# Patient Record
Sex: FEMALE | Race: WHITE | Employment: UNEMPLOYED | URBAN - METROPOLITAN AREA
[De-identification: names, ages, dates, MRNs, and addresses within clinical notes are randomized per-mention and may not be internally consistent; named-entity substitution may affect disease eponyms.]

---

## 2017-04-06 ENCOUNTER — OFFICE VISIT (OUTPATIENT)
Dept: CARDIOLOGY CLINIC | Age: 52
End: 2017-04-06

## 2017-04-06 VITALS
DIASTOLIC BLOOD PRESSURE: 75 MMHG | HEART RATE: 66 BPM | SYSTOLIC BLOOD PRESSURE: 140 MMHG | BODY MASS INDEX: 36.43 KG/M2 | WEIGHT: 246 LBS | HEIGHT: 69 IN

## 2017-04-06 DIAGNOSIS — R00.2 PALPITATIONS: Primary | ICD-10-CM

## 2017-04-06 DIAGNOSIS — E66.9 OBESITY, UNSPECIFIED: ICD-10-CM

## 2017-04-06 DIAGNOSIS — I10 ESSENTIAL HYPERTENSION, BENIGN: ICD-10-CM

## 2017-04-06 DIAGNOSIS — I34.0 NON-RHEUMATIC MITRAL REGURGITATION: ICD-10-CM

## 2017-04-06 RX ORDER — SERTRALINE HYDROCHLORIDE 50 MG/1
TABLET, FILM COATED ORAL DAILY
COMMUNITY
End: 2018-08-10

## 2017-04-06 RX ORDER — AMLODIPINE BESYLATE 5 MG/1
5 TABLET ORAL DAILY
COMMUNITY
End: 2018-02-05

## 2017-04-06 RX ORDER — ZOLPIDEM TARTRATE 10 MG/1
TABLET ORAL
COMMUNITY

## 2017-04-06 NOTE — PROGRESS NOTES
HISTORY OF PRESENT ILLNESS  Renee Pierre is a 45 y.o. female. HPI Comments: 4/17 Seen after >3 yrs. Has HTN more than usual for last 1 wk    Palpitations    The history is provided by the patient. This is a new problem. The current episode started more than 2 days ago (4/2/17). The problem occurs daily (many). The problem is associated with nothing. Associated symptoms include lower extremity edema. Pertinent negatives include no diaphoresis, no fever, no malaise/fatigue, no chest pain, no claudication, no near-syncope, no orthopnea, no PND, no nausea, no vomiting, no headaches, no dizziness, no cough and no shortness of breath. Leg Swelling   The history is provided by the patient. This is a new problem. The current episode started more than 1 week ago. The problem occurs every several days. Pertinent negatives include no chest pain, no headaches and no shortness of breath. The symptoms are aggravated by standing. The symptoms are relieved by sleep. Review of Systems   Constitutional: Negative for chills, diaphoresis, fever, malaise/fatigue and weight loss. HENT: Negative for nosebleeds. Eyes: Negative for discharge. Respiratory: Negative for cough, shortness of breath and wheezing. Cardiovascular: Positive for palpitations and leg swelling. Negative for chest pain, orthopnea, claudication, PND and near-syncope. Gastrointestinal: Negative for diarrhea, nausea and vomiting. Genitourinary: Negative for dysuria and hematuria. Musculoskeletal: Negative for joint pain. Skin: Negative for rash. Neurological: Negative for dizziness, seizures, loss of consciousness and headaches. Endo/Heme/Allergies: Negative for polydipsia. Does not bruise/bleed easily. Psychiatric/Behavioral: Negative for depression and substance abuse. The patient does not have insomnia.       Allergies   Allergen Reactions    Codeine Itching       Past Medical History:   Diagnosis Date    Aortic valve disorders  Degenerative joint disease     Essential hypertension, benign     CONTROLLED     Lumbar herniated disc     Mitral valve disorders      MILD MR     Obesity, unspecified     Transfusion history     PATIENT HAS A HX OF RECEIVING BLOOD OR BLOOD PRODUCT TRANSFUSION(S)       Family History   Problem Relation Age of Onset    Heart Disease Mother 48      WITH MI    Heart Attack Mother     Heart Surgery Mother     Heart Attack Father        Social History   Substance Use Topics    Smoking status: Former Smoker     Quit date: 2015    Smokeless tobacco: None      Comment: SMOKED FOR LESS THAN 5 YEARS MANY YEARS AGO    Alcohol use 0.6 oz/week     1 Glasses of wine per week        Current Outpatient Prescriptions   Medication Sig    amLODIPine (NORVASC) 5 mg tablet Take 5 mg by mouth daily.  sertraline (ZOLOFT) 50 mg tablet Take  by mouth daily.  zolpidem (AMBIEN) 10 mg tablet Take  by mouth nightly as needed for Sleep. No current facility-administered medications for this visit. Past Surgical History:   Procedure Laterality Date    HX HERNIA REPAIR      HX LAP CHOLECYSTECTOMY      HX TUBAL LIGATION         Visit Vitals    /75    Pulse 66    Ht 5' 9\" (1.753 m)    Wt 111.6 kg (246 lb)    BMI 36.33 kg/m2       Diagnostic Studies:  I have reviewed the relevant tests done on the patient and show as follows  EKG tracings reviewed by me today. CARDIOLOGY STUDIES 2009 2004 3/1/2001   Myocardial Perfusion Scan Result - - ? NL SCAN, MILD FIXED ANTERIOR DEFECT, MILD CMP, EF 34%   Echocardiogram - Complete Result EF 60%, TRACE MR, MILD AI EF 60%, MILD MVP, MILD MR, MILD AI -   24 hr Holter Monitor Result - NO ARRHYTHMIAS -       Ms. Michael Colon has a reminder for a \"due or due soon\" health maintenance. I have asked that she contact her primary care provider for follow-up on this health maintenance.     Physical Exam   Constitutional: She is oriented to person, place, and time. She appears well-developed and well-nourished. No distress. sev obese   HENT:   Head: Normocephalic and atraumatic. Mouth/Throat: Normal dentition. Eyes: Right eye exhibits no discharge. Left eye exhibits no discharge. No scleral icterus. Neck: Neck supple. No JVD present. Carotid bruit is not present. No thyromegaly present. Cardiovascular: Normal rate, regular rhythm, S1 normal, S2 normal, normal heart sounds and intact distal pulses. Exam reveals no gallop and no friction rub. No murmur heard. Pulmonary/Chest: Effort normal and breath sounds normal. She has no wheezes. She has no rales. Abdominal: Soft. She exhibits no mass. There is no tenderness. Musculoskeletal: She exhibits no edema. Lymphadenopathy:        Right cervical: No superficial cervical adenopathy present. Left cervical: No superficial cervical adenopathy present. Neurological: She is alert and oriented to person, place, and time. Skin: Skin is warm and dry. No rash noted. Psychiatric: She has a normal mood and affect. Her behavior is normal.       ASSESSMENT and PLAN      Chriss Wilson was seen today for new patient, hypertension and palpitations. Diagnoses and all orders for this visit:    Palpitations  Comments:  hemodynamically tolerated  get labs from PCP  Orders:  -     AMB POC EKG ROUTINE W/ 12 LEADS, INTER & REP  -     METABOLIC PANEL, BASIC; Future  -     MAGNESIUM; Future  -     HEPATIC FUNCTION PANEL; Future  -     ECG MONITOR/24 HRS,COMPLETE; Future    Essential hypertension, benign  Comments:  CONTROLLED  get labs from PCP  Orders:  -     METABOLIC PANEL, BASIC; Future  -     MAGNESIUM; Future  -     HEPATIC FUNCTION PANEL; Future    Non-rheumatic mitral regurgitation  Comments:  8/09 MILD MR  Orders:  -     2D ECHO COMPLETE ADULT (TTE); Future    Obesity, unspecified  Comments:  Weight loss has been strongly encouraged by following dietary restrictions and an exercise routine.           Pertinent laboratory and test data reviewed and discussed with patient.   See patient instructions also for other medical advice given    Medications Discontinued During This Encounter   Medication Reason    ferrous sulfate 325 mg (65 mg iron) tablet Not A Current Medication    FLUoxetine (PROZAC) 20 mg capsule Discontinued by Another Clinician       Follow-up Disposition:  Return in about 1 month (around 5/6/2017), or if symptoms worsen or fail to improve, for post test.

## 2017-04-06 NOTE — LETTER
García Jastifoniel  1965 4/6/2017      Dear MD Hiral Steele, STEVEN    I had the pleasure of evaluating  Ms. Andrea Reynoso in office today. Below are the relevant portions of my assessment and plan of care. ICD-10-CM ICD-9-CM    1. Palpitations R00.2 785.1 AMB POC EKG ROUTINE W/ 12 LEADS, INTER & REP      METABOLIC PANEL, BASIC      MAGNESIUM      HEPATIC FUNCTION PANEL      ECG MONITOR/24 HRS,COMPLETE    hemodynamically tolerated  get labs from PCP   2. Essential hypertension, benign O82 674.6 METABOLIC PANEL, BASIC      MAGNESIUM      HEPATIC FUNCTION PANEL    CONTROLLED  get labs from PCP   3. Non-rheumatic mitral regurgitation I34.0 424.0 2D ECHO COMPLETE ADULT (TTE)    8/09 MILD MR   4. Obesity, unspecified E66.9 278.00     Weight loss has been strongly encouraged by following dietary restrictions and an exercise routine. Current Outpatient Prescriptions   Medication Sig Dispense Refill    amLODIPine (NORVASC) 5 mg tablet Take 5 mg by mouth daily.  sertraline (ZOLOFT) 50 mg tablet Take  by mouth daily.  zolpidem (AMBIEN) 10 mg tablet Take  by mouth nightly as needed for Sleep.          Orders Placed This Encounter    METABOLIC PANEL, BASIC     Standing Status:   Future     Standing Expiration Date:   7/7/2017    MAGNESIUM     Standing Status:   Future     Standing Expiration Date:   7/7/2017    HEPATIC FUNCTION PANEL     Standing Status:   Future     Standing Expiration Date:   7/7/2017    2D ECHO COMPLETE ADULT (TTE)     Standing Status:   Future     Standing Expiration Date:   10/3/2017     Order Specific Question:   Reason for Exam:     Answer:   mr    ECG MONITOR/24 HRS,COMPLETE     Standing Status:   Future     Standing Expiration Date:   10/3/2017     Order Specific Question:   Reason for Exam:     Answer:   palpitations    AMB POC EKG ROUTINE W/ 12 LEADS, INTER & REP     Order Specific Question:   Reason for Exam:     Answer:   palpitations    amLODIPine (NORVASC) 5 mg tablet     Sig: Take 5 mg by mouth daily.  sertraline (ZOLOFT) 50 mg tablet     Sig: Take  by mouth daily.  zolpidem (AMBIEN) 10 mg tablet     Sig: Take  by mouth nightly as needed for Sleep. If you have questions, please do not hesitate to call me. I look forward to following Ms. Persaud along with you.     Sincerely,  Beatriz Kennedy MD

## 2017-04-06 NOTE — PATIENT INSTRUCTIONS
Medications Discontinued During This Encounter   Medication Reason    ferrous sulfate 325 mg (65 mg iron) tablet Not A Current Medication    FLUoxetine (PROZAC) 20 mg capsule Discontinued by Another Clinician       Orders Placed This Encounter    METABOLIC PANEL, BASIC     Standing Status:   Future     Standing Expiration Date:   7/7/2017    MAGNESIUM     Standing Status:   Future     Standing Expiration Date:   7/7/2017    HEPATIC FUNCTION PANEL     Standing Status:   Future     Standing Expiration Date:   7/7/2017    2D ECHO COMPLETE ADULT (TTE)     Standing Status:   Future     Standing Expiration Date:   10/3/2017     Order Specific Question:   Reason for Exam:     Answer:   mr    ECG MONITOR/24 HRS,COMPLETE     Standing Status:   Future     Standing Expiration Date:   10/3/2017     Order Specific Question:   Reason for Exam:     Answer:   palpitations    AMB POC EKG ROUTINE W/ 12 LEADS, INTER & REP     Order Specific Question:   Reason for Exam:     Answer:   palpitations          Mediterranean Diet: Care Instructions  Your Care Instructions  The Mediterranean diet features foods eaten in [place], [place], [place] and [place], and other countries that border the [facility]. It emphasizes eating a diet rich in fruits, vegetables, nuts, and high-fiber grains, and limits meat, cheese, and sweets. The Mediterranean diet may:  · Prevent heart disease and lower the risk of a heart attack or stroke. · Prevent type 2 diabetes. · Prevent Alzheimer's disease and other dementia. · Prevent depression. · Prevent Parkinson's disease. This diet contains more fat than other heart-healthy diets. But the fats are mainly from nuts, unsaturated oils, such as fish oils, olive oil, and certain nut or seed oils (such as canola, soybean, or flaxseed oil). These types of oils may help protect the heart and blood vessels. Follow-up care is a key part of your treatment and safety.  Be sure to make and go to all appointments, and call your doctor if you are having problems. It's also a good idea to know your test results and keep a list of the medicines you take. How can you care for yourself at home? What to eat  · Eat a variety of fruits and vegetables each day, such as grapes, blueberries, tomatoes, broccoli, peppers, figs, olives, spinach, eggplant, beans, lentils, and chickpeas. · Eat a variety of whole-grain foods each day, such as oats, brown rice, and whole wheat bread, pasta, and couscous. · Eat fish at least 2 times a week. Try tuna, salmon, mackerel, lake trout, herring, or sardines. · Eat moderate amounts of low-fat dairy products, such as milk, cheese, or yogurt. · Eat moderate amounts of poultry and eggs. · Choose healthy (unsaturated) fats, such as nuts, olive oil and certain nut or seed oils like canola, soybean, and flaxseed. · Limit unhealthy (saturated) fats, such as butter, palm oil, and coconut oil. And limit fats found in animal products, such as meat and dairy products made with whole milk. Try to eat red meat only a few times a month in very small amounts. · Limit sweets and desserts to only a few times a week. This includes sugar-sweetened drinks like soda. The Mediterranean diet may also include red wine with your meal1 glass each day for women and up to 2 glasses a day for men. Tips for changing your diet  · Dip bread in a mix of olive oil and fresh herbs instead of using butter. · Add avocado slices to your sandwich instead of curran. · Have fish for lunch or dinner instead of red meat. Brush the fish with olive oil, and broil or grill it. · Sprinkle your salad with seeds or nuts instead of cheese. · Cook with olive or canola oil instead of butter or oils that are high in saturated fat. · Switch from 2% milk or whole milk to 1% or fat-free milk.   · Dip raw vegetables in a vinaigrette dressing or hummus instead of dips made from mayonnaise or sour cream.  · Have a piece of fruit for dessert instead of a piece of cake. Try baked apples, or have some dried fruit. Part of the Mediterranean diet is being active. Get at least 30 minutes of exercise on most days of the week. Walking is a good choice. You also may want to do other activities, such as running, swimming, cycling, or playing tennis or team sports. Where can you learn more? Go to http://hu-evie.info/. Enter O407 in the search box to learn more about \"Mediterranean Diet: Care Instructions. \"  Current as of: October 21, 2016  Content Version: 11.2  © 9372-5101 [employer]. Care instructions adapted under license by [employer] (which disclaims liability or warranty for this information). If you have questions about a medical condition or this instruction, always ask your healthcare professional. [address] any warranty or liability for your use of this information.

## 2017-04-06 NOTE — MR AVS SNAPSHOT
Visit Information     Date & Time Provider Department Dept. Phone Encounter #    4/6/2017 12:45 PM Paz Schwarz MD [facility] [phone] [id number]      Follow-up Instructions     Return in about 1 month (around 5/6/2017), or if symptoms worsen or fail to improve, for post test.      Your Appointments     4/17/2017  1:30 PM   PROCEDURE with CA ECHO   [facility] ([address])   Appt Note: Echo/Do    [address]           [address] [place] [address]            5/18/2017  9:30 AM   ESTABLISHED PATIENT with Paz Schwarz MD   [facility] ([id number] [place])   Appt Note: Post echo/holter/BMP/Mag/LFT    [address]           [address] [facility] [address]              Upcoming Health Maintenance        Date Due    Hepatitis C Screening 1965    DTaP/Tdap/Td series (1 - Tdap) 12/17/1985    PAP AKA CERVICAL CYTOLOGY 12/17/1985    BREAST CANCER SCRN MAMMOGRAM 12/17/2014    FOBT Q 1 YEAR AGE 50-75 12/17/2014    INFLUENZA AGE 9 TO ADULT 8/1/2016      Allergies as of 4/6/2017  Review Complete On: 4/6/2017 By: Paz Schwarz MD       Severity Noted Reaction Type Reactions    Codeine High   Itching      Current Immunizations  Never Reviewed    No immunizations on file. Not reviewed this visit   You Were Diagnosed With        Codes Comments    Palpitations    -  Primary ICD-10-CM: R00.2  ICD-9-CM: 785.1 hemodynamically tolerated  get labs from PCP    Essential hypertension, benign     ICD-10-CM: I10  ICD-9-CM: 401.1 CONTROLLED  get labs from PCP    Non-rheumatic mitral regurgitation     ICD-10-CM: I34.0  ICD-9-CM: 424.0 8/09 MILD MR    Obesity, unspecified     ICD-10-CM: E66.9  ICD-9-CM: 278.00 Weight loss has been strongly encouraged by following dietary restrictions and an exercise routine.         Vitals     BP Pulse Height(growth percentile) Weight(growth percentile) BMI Smoking Status    140/75 66 5' 9\" (1.753 m) 246 lb (111.6 kg) 36.33 kg/m2 Former Smoker    Vitals History      BMI and BSA Data     Body Mass Index Body Surface Area    36.33 kg/m 2 2.33 m 2         Your Updated Medication List          This list is accurate as of: 4/6/17  2:03 PM.  Always use your most recent med list.                amLODIPine 5 mg tablet   Commonly known as:  NORVASC   Take 5 mg by mouth daily. sertraline 50 mg tablet   Commonly known as:  ZOLOFT   Take  by mouth daily. zolpidem 10 mg tablet   Commonly known as:  AMBIEN   Take  by mouth nightly as needed for Sleep.                We Performed the Following     AMB POC EKG ROUTINE W/ 12 LEADS, INTER & REP [82808 CPT(R)]       Follow-up Instructions     Return in about 1 month (around 5/6/2017), or if symptoms worsen or fail to improve, for post test.      To-Do List     Around 04/06/2017     Lab:  HEPATIC FUNCTION PANEL        Around 04/06/2017     Lab:  MAGNESIUM        Around 04/06/2017     Lab:  METABOLIC PANEL, BASIC        Around 04/09/2017     Cardiac Services:  2D ECHO COMPLETE ADULT (TTE)        Around 04/09/2017     Cardiac Services:  ECG MONITOR/24 HRS,COMPLETE          Patient Instructions    Medications Discontinued During This Encounter   Medication Reason    ferrous sulfate 325 mg (65 mg iron) tablet Not A Current Medication    FLUoxetine (PROZAC) 20 mg capsule Discontinued by Another Clinician       Orders Placed This Encounter    METABOLIC PANEL, BASIC     Standing Status:   Future     Standing Expiration Date:   7/7/2017    MAGNESIUM     Standing Status:   Future     Standing Expiration Date:   7/7/2017    HEPATIC FUNCTION PANEL     Standing Status:   Future     Standing Expiration Date:   7/7/2017    2D ECHO COMPLETE ADULT (TTE)     Standing Status:   Future     Standing Expiration Date:   10/3/2017     Order Specific Question:   Reason for Exam:     Answer:   mr    ECG MONITOR/24 HRS,COMPLETE     Standing Status:   Future     Standing Expiration Date:   10/3/2017     Order Specific Question:   Reason for Exam:     Answer:   palpitations    AMB POC EKG ROUTINE W/ 12 LEADS, INTER & REP     Order Specific Question:   Reason for Exam:     Answer:   palpitations          Mediterranean Diet: Care Instructions  Your Care Instructions  The Mediterranean diet features foods eaten in [place], [place], [place] and [place], and other countries that border the [facility]. It emphasizes eating a diet rich in fruits, vegetables, nuts, and high-fiber grains, and limits meat, cheese, and sweets. The Mediterranean diet may:  · Prevent heart disease and lower the risk of a heart attack or stroke. · Prevent type 2 diabetes. · Prevent Alzheimer's disease and other dementia. · Prevent depression. · Prevent Parkinson's disease. This diet contains more fat than other heart-healthy diets. But the fats are mainly from nuts, unsaturated oils, such as fish oils, olive oil, and certain nut or seed oils (such as canola, soybean, or flaxseed oil). These types of oils may help protect the heart and blood vessels. Follow-up care is a key part of your treatment and safety. Be sure to make and go to all appointments, and call your doctor if you are having problems. It's also a good idea to know your test results and keep a list of the medicines you take. How can you care for yourself at home? What to eat  · Eat a variety of fruits and vegetables each day, such as grapes, blueberries, tomatoes, broccoli, peppers, figs, olives, spinach, eggplant, beans, lentils, and chickpeas. · Eat a variety of whole-grain foods each day, such as oats, brown rice, and whole wheat bread, pasta, and couscous. · Eat fish at least 2 times a week. Try tuna, salmon, mackerel, lake trout, herring, or sardines. · Eat moderate amounts of low-fat dairy products, such as milk, cheese, or yogurt.   · Eat moderate amounts of poultry and eggs. · Choose healthy (unsaturated) fats, such as nuts, olive oil and certain nut or seed oils like canola, soybean, and flaxseed. · Limit unhealthy (saturated) fats, such as butter, palm oil, and coconut oil. And limit fats found in animal products, such as meat and dairy products made with whole milk. Try to eat red meat only a few times a month in very small amounts. · Limit sweets and desserts to only a few times a week. This includes sugar-sweetened drinks like soda. The Mediterranean diet may also include red wine with your meal1 glass each day for women and up to 2 glasses a day for men. Tips for changing your diet  · Dip bread in a mix of olive oil and fresh herbs instead of using butter. · Add avocado slices to your sandwich instead of curran. · Have fish for lunch or dinner instead of red meat. Brush the fish with olive oil, and broil or grill it. · Sprinkle your salad with seeds or nuts instead of cheese. · Cook with olive or canola oil instead of butter or oils that are high in saturated fat. · Switch from 2% milk or whole milk to 1% or fat-free milk. · Dip raw vegetables in a vinaigrette dressing or hummus instead of dips made from mayonnaise or sour cream.  · Have a piece of fruit for dessert instead of a piece of cake. Try baked apples, or have some dried fruit. Part of the Mediterranean diet is being active. Get at least 30 minutes of exercise on most days of the week. Walking is a good choice. You also may want to do other activities, such as running, swimming, cycling, or playing tennis or team sports. Where can you learn more? Go to http://hu-evie.info/. Enter O407 in the search box to learn more about \"Mediterranean Diet: Care Instructions. \"  Current as of: October 21, 2016  Content Version: 11.2  © 3229-2142 [employer].  Care instructions adapted under license by [employer] (which disclaims liability or warranty for this information). If you have questions about a medical condition or this instruction, always ask your healthcare professional. [address] any warranty or liability for your use of this information. Introducing [facility]! Madina Herrera[employer]om introduces The [employer] patient portal. Now you can access parts of your medical record, email your doctor's office, and request medication refills online. 1. In your internet browser, go to [email or web address]   2. Click on the First Time User? Click Here link in the Sign In box. You will see the New Member Sign Up page. 3. Enter your [employer] Access Code exactly as it appears below. You will not need to use this code after youve completed the sign-up process. If you do not sign up before the expiration date, you must request a new code. · [employer] Access Code: [email or web address]  Expires: 7/5/2017 12:35 PM    4. Enter the last four digits of your Social Security Number (xxxx) and Date of Birth (mm/dd/yyyy) as indicated and click Submit. You will be taken to the next sign-up page. 5. Create a The [employer] ID. This will be your [employer] login ID and cannot be changed, so think of one that is secure and easy to remember. 6. Create a [employer] password. You can change your password at any time. 7. Enter your Password Reset Question and Answer. This can be used at a later time if you forget your password. 8. Enter your e-mail address. You will receive e-mail notification when new information is available in [address] 9. Click Sign Up. You can now view and download portions of your medical record. 10. Click the Download Summary menu link to download a portable copy of your medical information. If you have questions, please visit the Frequently Asked Questions section of the [employer] website. Remember, [employer] is NOT to be used for urgent needs. For medical emergencies, dial 911. Now available from your iPhone and Android! Please provide this summary of care documentation to your next provider. Your primary care clinician is listed as Narendra Espinal. If you have any questions after today's visit, please call [phone].

## 2017-04-11 LAB
A-G RATIO,AGRAT: 1.2 RATIO (ref 1.1–2.6)
ALBUMIN SERPL-MCNC: 4 G/DL (ref 3.5–5)
ALP SERPL-CCNC: 81 U/L (ref 25–115)
ALT SERPL-CCNC: 15 U/L (ref 5–40)
ANION GAP SERPL CALC-SCNC: 13.3 MMOL/L
AST SERPL W P-5'-P-CCNC: 15 U/L (ref 10–37)
BILIRUB SERPL-MCNC: 0.4 MG/DL (ref 0.2–1.2)
BILIRUBIN, DIRECT,CBIL: <0.2 MG/DL (ref 0–0.3)
BUN SERPL-MCNC: 13 MG/DL (ref 6–22)
CALCIUM SERPL-MCNC: 9.4 MG/DL (ref 8.4–10.5)
CHLORIDE SERPL-SCNC: 102 MMOL/L (ref 98–110)
CO2 SERPL-SCNC: 26 MMOL/L (ref 20–32)
CREAT SERPL-MCNC: 0.5 MG/DL (ref 0.5–1.2)
GFRAA, 66117: >60
GFRNA, 66118: >60
GLOBULIN,GLOB: 3.3 G/DL (ref 2–4)
GLUCOSE SERPL-MCNC: 100 MG/DL (ref 65–99)
MAGNESIUM SERPL-MCNC: 2.1 MG/DL (ref 1.6–2.5)
POTASSIUM SERPL-SCNC: 3.9 MMOL/L (ref 3.5–5.5)
PROT SERPL-MCNC: 7.3 G/DL (ref 6.4–8.3)
SODIUM SERPL-SCNC: 141 MMOL/L (ref 133–145)

## 2017-04-17 ENCOUNTER — CLINICAL SUPPORT (OUTPATIENT)
Dept: CARDIOLOGY CLINIC | Age: 52
End: 2017-04-17

## 2017-04-17 DIAGNOSIS — I34.0 NON-RHEUMATIC MITRAL REGURGITATION: ICD-10-CM

## 2017-04-18 DIAGNOSIS — R00.2 PALPITATIONS: ICD-10-CM

## 2017-05-18 ENCOUNTER — OFFICE VISIT (OUTPATIENT)
Dept: CARDIOLOGY CLINIC | Age: 52
End: 2017-05-18

## 2017-05-18 VITALS
HEART RATE: 62 BPM | WEIGHT: 257 LBS | BODY MASS INDEX: 38.06 KG/M2 | DIASTOLIC BLOOD PRESSURE: 72 MMHG | HEIGHT: 69 IN | SYSTOLIC BLOOD PRESSURE: 135 MMHG

## 2017-05-18 DIAGNOSIS — I34.0 NON-RHEUMATIC MITRAL REGURGITATION: ICD-10-CM

## 2017-05-18 DIAGNOSIS — I35.9 AORTIC VALVE DISORDERS: ICD-10-CM

## 2017-05-18 DIAGNOSIS — I10 ESSENTIAL HYPERTENSION, BENIGN: ICD-10-CM

## 2017-05-18 DIAGNOSIS — E66.01 SEVERE OBESITY (BMI 35.0-39.9): ICD-10-CM

## 2017-05-18 DIAGNOSIS — R00.2 PALPITATIONS: Primary | ICD-10-CM

## 2017-05-18 NOTE — PATIENT INSTRUCTIONS
There are no discontinued medications. No orders of the defined types were placed in this encounter. Body Mass Index: Care Instructions  Your Care Instructions    Body mass index (BMI) can help you see if your weight is raising your risk for health problems. It uses a formula to compare how much you weigh with how tall you are. · A BMI lower than 18.5 is considered underweight. · A BMI between 18.5 and 24.9 is considered healthy. · A BMI between 25 and 29.9 is considered overweight. A BMI of 30 or higher is considered obese. If your BMI is in the normal range, it means that you have a lower risk for weight-related health problems. If your BMI is in the overweight or obese range, you may be at increased risk for weight-related health problems, such as high blood pressure, heart disease, stroke, arthritis or joint pain, and diabetes. If your BMI is in the underweight range, you may be at increased risk for health problems such as fatigue, lower protection (immunity) against illness, muscle loss, bone loss, hair loss, and hormone problems. BMI is just one measure of your risk for weight-related health problems. You may be at higher risk for health problems if you are not active, you eat an unhealthy diet, or you drink too much alcohol or use tobacco products. Follow-up care is a key part of your treatment and safety. Be sure to make and go to all appointments, and call your doctor if you are having problems. It's also a good idea to know your test results and keep a list of the medicines you take. How can you care for yourself at home? · Practice healthy eating habits. This includes eating plenty of fruits, vegetables, whole grains, lean protein, and low-fat dairy. · If your doctor recommends it, get more exercise. Walking is a good choice. Bit by bit, increase the amount you walk every day. Try for at least 30 minutes on most days of the week. · Do not smoke.  Smoking can increase your risk for health problems. If you need help quitting, talk to your doctor about stop-smoking programs and medicines. These can increase your chances of quitting for good. · Limit alcohol to 2 drinks a day for men and 1 drink a day for women. Too much alcohol can cause health problems. If you have a BMI higher than 25  · Your doctor may do other tests to check your risk for weight-related health problems. This may include measuring the distance around your waist. A waist measurement of more than 40 inches in men or 35 inches in women can increase the risk of weight-related health problems. · Talk with your doctor about steps you can take to stay healthy or improve your health. You may need to make lifestyle changes to lose weight and stay healthy, such as changing your diet and getting regular exercise. If you have a BMI lower than 18.5  · Your doctor may do other tests to check your risk for health problems. · Talk with your doctor about steps you can take to stay healthy or improve your health. You may need to make lifestyle changes to gain or maintain weight and stay healthy, such as getting more healthy foods in your diet and doing exercises to build muscle. Where can you learn more? Go to http://hu-evie.info/. Enter S176 in the search box to learn more about \"Body Mass Index: Care Instructions. \"  Current as of: January 23, 2017  Content Version: 11.2  © 6658-7632 [employer]. Care instructions adapted under license by [employer] (which disclaims liability or warranty for this information). If you have questions about a medical condition or this instruction, always ask your healthcare professional. [address] any warranty or liability for your use of this information.

## 2017-05-18 NOTE — LETTER
Odell Benton  1965 5/18/2017      Dear MD Kristin Giles NP    I had the pleasure of evaluating  Ms. Sonam Hooks in office today. Below are the relevant portions of my assessment and plan of care. ICD-10-CM ICD-9-CM    1. Palpitations R00.2 785.1     5/17 improving spont; 3/17 hemodynamically tolerated     2. Aortic valve disorders I35.9 424.1     4/17 Mild AS/AI. Possible congenital- d/w pt . Will watch clinically for now     3. Non-rheumatic mitral regurgitation I34.0 424.0     4/17; 8/09 MILD MR   4. Essential hypertension, benign I10 401.1     CONTROLLED  get lipids from PCP   5. Severe obesity (BMI 35.0-39.9) ([facility]) E66.01 278.01     Weight loss has been strongly encouraged by following dietary restrictions and an exercise routine. Current Outpatient Prescriptions   Medication Sig Dispense Refill    amLODIPine (NORVASC) 5 mg tablet Take 5 mg by mouth daily.  sertraline (ZOLOFT) 50 mg tablet Take  by mouth daily.  zolpidem (AMBIEN) 10 mg tablet Take  by mouth nightly as needed for Sleep. No orders of the defined types were placed in this encounter. If you have questions, please do not hesitate to call me. I look forward to following Ms. Persaud along with you.     Sincerely,  Brain Teresa MD

## 2017-05-18 NOTE — MR AVS SNAPSHOT
Visit Information     Date & Time Provider Department Dept. Phone Encounter #    5/18/2017  9:30 AM John Alarcon MD [facility] [phone] [id number]      Follow-up Instructions     Return in about 6 months (around 11/18/2017), or if symptoms worsen or fail to improve. Upcoming Health Maintenance        Date Due    Hepatitis C Screening 1965    DTaP/Tdap/Td series (1 - Tdap) 12/17/1985    PAP AKA CERVICAL CYTOLOGY 12/17/1985    BREAST CANCER SCRN MAMMOGRAM 12/17/2014    FOBT Q 1 YEAR AGE 50-75 12/17/2014    INFLUENZA AGE 9 TO ADULT 8/1/2017      Allergies as of 5/18/2017  Review Complete On: 5/18/2017 By: John Alarcon MD       Severity Noted Reaction Type Reactions    Codeine High   Itching      Current Immunizations  Never Reviewed    No immunizations on file. Not reviewed this visit   You Were Diagnosed With        Codes Comments    Palpitations    -  Primary ICD-10-CM: R00.2  ICD-9-CM: 785.1 5/17 improving spont; 3/17 hemodynamically tolerated      Aortic valve disorders     ICD-10-CM: I35.9  ICD-9-CM: 424.1 4/17 Mild AS/AI. Possible congenital- d/w pt . Will watch clinically for now      Non-rheumatic mitral regurgitation     ICD-10-CM: I34.0  ICD-9-CM: 424.0 4/17; 8/09 MILD MR    Essential hypertension, benign     ICD-10-CM: I10  ICD-9-CM: 401.1 CONTROLLED  get lipids from PCP    Severe obesity (BMI 35.0-39.9) (HCC)     ICD-10-CM: E66.01  ICD-9-CM: 278.01 Weight loss has been strongly encouraged by following dietary restrictions and an exercise routine.         Vitals     BP Pulse Height(growth percentile) Weight(growth percentile) BMI Smoking Status    135/72 62 5' 9\" (1.753 m) 257 lb (116.6 kg) 37.95 kg/m2 Former Smoker    Vitals History      BMI and BSA Data     Body Mass Index Body Surface Area    37.95 kg/m 2 2.38 m 2         Your Updated Medication List          This list is accurate as of: 5/18/17 10:14 AM.  Always use your most recent med list. amLODIPine 5 mg tablet   Commonly known as:  NORVASC   Take 5 mg by mouth daily. sertraline 50 mg tablet   Commonly known as:  ZOLOFT   Take  by mouth daily. zolpidem 10 mg tablet   Commonly known as:  AMBIEN   Take  by mouth nightly as needed for Sleep. Follow-up Instructions     Return in about 6 months (around 11/18/2017), or if symptoms worsen or fail to improve. Patient Instructions    There are no discontinued medications. No orders of the defined types were placed in this encounter. Body Mass Index: Care Instructions  Your Care Instructions    Body mass index (BMI) can help you see if your weight is raising your risk for health problems. It uses a formula to compare how much you weigh with how tall you are. · A BMI lower than 18.5 is considered underweight. · A BMI between 18.5 and 24.9 is considered healthy. · A BMI between 25 and 29.9 is considered overweight. A BMI of 30 or higher is considered obese. If your BMI is in the normal range, it means that you have a lower risk for weight-related health problems. If your BMI is in the overweight or obese range, you may be at increased risk for weight-related health problems, such as high blood pressure, heart disease, stroke, arthritis or joint pain, and diabetes. If your BMI is in the underweight range, you may be at increased risk for health problems such as fatigue, lower protection (immunity) against illness, muscle loss, bone loss, hair loss, and hormone problems. BMI is just one measure of your risk for weight-related health problems. You may be at higher risk for health problems if you are not active, you eat an unhealthy diet, or you drink too much alcohol or use tobacco products. Follow-up care is a key part of your treatment and safety. Be sure to make and go to all appointments, and call your doctor if you are having problems.  It's also a good idea to know your test results and keep a list of the medicines you take. How can you care for yourself at home? · Practice healthy eating habits. This includes eating plenty of fruits, vegetables, whole grains, lean protein, and low-fat dairy. · If your doctor recommends it, get more exercise. Walking is a good choice. Bit by bit, increase the amount you walk every day. Try for at least 30 minutes on most days of the week. · Do not smoke. Smoking can increase your risk for health problems. If you need help quitting, talk to your doctor about stop-smoking programs and medicines. These can increase your chances of quitting for good. · Limit alcohol to 2 drinks a day for men and 1 drink a day for women. Too much alcohol can cause health problems. If you have a BMI higher than 25  · Your doctor may do other tests to check your risk for weight-related health problems. This may include measuring the distance around your waist. A waist measurement of more than 40 inches in men or 35 inches in women can increase the risk of weight-related health problems. · Talk with your doctor about steps you can take to stay healthy or improve your health. You may need to make lifestyle changes to lose weight and stay healthy, such as changing your diet and getting regular exercise. If you have a BMI lower than 18.5  · Your doctor may do other tests to check your risk for health problems. · Talk with your doctor about steps you can take to stay healthy or improve your health. You may need to make lifestyle changes to gain or maintain weight and stay healthy, such as getting more healthy foods in your diet and doing exercises to build muscle. Where can you learn more? Go to http://hu-evie.info/. Enter S176 in the search box to learn more about \"Body Mass Index: Care Instructions. \"  Current as of: January 23, 2017  Content Version: 11.2  © 6642-9984 [employer].  Care instructions adapted under license by [employer] (which disclaims liability or warranty for this information). If you have questions about a medical condition or this instruction, always ask your healthcare professional. [address] any warranty or liability for your use of this information. Introducing [facility]! Elizabeth Lin introduces [facility] patient portal. Now you can access parts of your medical record, email your doctor's office, and request medication refills online. 1. In your internet browser, go to [email or web address]   2. Click on the First Time User? Click Here link in the Sign In box. You will see the New Member Sign Up page. 3. Enter your IMVU Access Code exactly as it appears below. You will not need to use this code after youve completed the sign-up process. If you do not sign up before the expiration date, you must request a new code. · IMVU Access Code: [email or web address]  Expires: 7/5/2017 12:35 PM    4. Enter the last four digits of your Social Security Number (xxxx) and Date of Birth (mm/dd/yyyy) as indicated and click Submit. You will be taken to the next sign-up page. 5. Create a IMVU ID. This will be your IMVU login ID and cannot be changed, so think of one that is secure and easy to remember. 6. Create a IMVU password. You can change your password at any time. 7. Enter your Password Reset Question and Answer. This can be used at a later time if you forget your password. 8. Enter your e-mail address. You will receive e-mail notification when new information is available in [address] 9. Click Sign Up. You can now view and download portions of your medical record. 10. Click the Download Summary menu link to download a portable copy of your medical information. If you have questions, please visit the Frequently Asked Questions section of the IMVU website. Remember, IMVU is NOT to be used for urgent needs. For medical emergencies, dial 911.       Now available from your iPhone and Android! Please provide this summary of care documentation to your next provider. Your primary care clinician is listed as Lawrence Cruz. If you have any questions after today's visit, please call [phone].

## 2017-05-18 NOTE — PROGRESS NOTES
1. Have you been to the ER, urgent care clinic since your last visit? Hospitalized since your last visit?     no    2. Have you seen or consulted any other health care providers outside of the [address] [place] [address] since your last visit? Include any pap smears or colon screening. No     3. Since your last visit, have you had any of the following symptoms?      palpitations. 4.  Have you had any blood work, X-rays or cardiac testing? No    5. Where do you normally have your labs drawn? [facility]  6. Do you need any refills today?    No

## 2018-02-05 ENCOUNTER — OFFICE VISIT (OUTPATIENT)
Dept: CARDIOLOGY CLINIC | Age: 53
End: 2018-02-05

## 2018-02-05 VITALS
WEIGHT: 267 LBS | HEIGHT: 69 IN | SYSTOLIC BLOOD PRESSURE: 134 MMHG | DIASTOLIC BLOOD PRESSURE: 76 MMHG | HEART RATE: 62 BPM | BODY MASS INDEX: 39.55 KG/M2

## 2018-02-05 DIAGNOSIS — I35.2 NONRHEUMATIC AORTIC INSUFFICIENCY WITH AORTIC STENOSIS: ICD-10-CM

## 2018-02-05 DIAGNOSIS — E66.01 SEVERE OBESITY (BMI 35.0-39.9): ICD-10-CM

## 2018-02-05 DIAGNOSIS — R05.9 COUGH: ICD-10-CM

## 2018-02-05 DIAGNOSIS — R07.2 PRECORDIAL PAIN: ICD-10-CM

## 2018-02-05 DIAGNOSIS — I10 ESSENTIAL HYPERTENSION, BENIGN: ICD-10-CM

## 2018-02-05 DIAGNOSIS — I34.0 NON-RHEUMATIC MITRAL REGURGITATION: Primary | ICD-10-CM

## 2018-02-05 RX ORDER — LOSARTAN POTASSIUM 50 MG/1
50 TABLET ORAL DAILY
Qty: 90 TAB | Refills: 1 | Status: SHIPPED | OUTPATIENT
Start: 2018-02-05 | End: 2018-08-12 | Stop reason: SDUPTHER

## 2018-02-05 RX ORDER — LISINOPRIL 20 MG/1
TABLET ORAL DAILY
COMMUNITY
End: 2018-02-05

## 2018-02-05 NOTE — PROGRESS NOTES
1. Have you been to the ER, urgent care clinic since your last visit? Hospitalized since your last visit?     no    2. Have you seen or consulted any other health care providers outside of the [address] [place] [address] since your last visit? Include any pap smears or colon screening. No     3. Since your last visit, have you had any of the following symptoms?      palpitations. 4.  Have you had any blood work, X-rays or cardiac testing? No         5. Where do you normally have your labs drawn? 6. Do you need any refills today?    no

## 2018-02-05 NOTE — PATIENT INSTRUCTIONS
Medications Discontinued During This Encounter   Medication Reason    amLODIPine (NORVASC) 5 mg tablet Not A Current Medication    lisinopril (PRINIVIL, ZESTRIL) 20 mg tablet Other       Orders Placed This Encounter    2D ECHO COMPLETE ADULT (TTE)     Standing Status:   Future     Standing Expiration Date:   8/4/2018     Order Specific Question:   Reason for Exam:     Answer:   as/mr    losartan (COZAAR) 50 mg tablet     Sig: Take 1 Tab by mouth daily. Dispense:  90 Tab     Refill:  1          Body Mass Index: Care Instructions  Your Care Instructions    Body mass index (BMI) can help you see if your weight is raising your risk for health problems. It uses a formula to compare how much you weigh with how tall you are. · A BMI lower than 18.5 is considered underweight. · A BMI between 18.5 and 24.9 is considered healthy. · A BMI between 25 and 29.9 is considered overweight. A BMI of 30 or higher is considered obese. If your BMI is in the normal range, it means that you have a lower risk for weight-related health problems. If your BMI is in the overweight or obese range, you may be at increased risk for weight-related health problems, such as high blood pressure, heart disease, stroke, arthritis or joint pain, and diabetes. If your BMI is in the underweight range, you may be at increased risk for health problems such as fatigue, lower protection (immunity) against illness, muscle loss, bone loss, hair loss, and hormone problems. BMI is just one measure of your risk for weight-related health problems. You may be at higher risk for health problems if you are not active, you eat an unhealthy diet, or you drink too much alcohol or use tobacco products. Follow-up care is a key part of your treatment and safety. Be sure to make and go to all appointments, and call your doctor if you are having problems. It's also a good idea to know your test results and keep a list of the medicines you take.   How can you care for yourself at home? · Practice healthy eating habits. This includes eating plenty of fruits, vegetables, whole grains, lean protein, and low-fat dairy. · If your doctor recommends it, get more exercise. Walking is a good choice. Bit by bit, increase the amount you walk every day. Try for at least 30 minutes on most days of the week. · Do not smoke. Smoking can increase your risk for health problems. If you need help quitting, talk to your doctor about stop-smoking programs and medicines. These can increase your chances of quitting for good. · Limit alcohol to 2 drinks a day for men and 1 drink a day for women. Too much alcohol can cause health problems. If you have a BMI higher than 25  · Your doctor may do other tests to check your risk for weight-related health problems. This may include measuring the distance around your waist. A waist measurement of more than 40 inches in men or 35 inches in women can increase the risk of weight-related health problems. · Talk with your doctor about steps you can take to stay healthy or improve your health. You may need to make lifestyle changes to lose weight and stay healthy, such as changing your diet and getting regular exercise. If you have a BMI lower than 18.5  · Your doctor may do other tests to check your risk for health problems. · Talk with your doctor about steps you can take to stay healthy or improve your health. You may need to make lifestyle changes to gain or maintain weight and stay healthy, such as getting more healthy foods in your diet and doing exercises to build muscle. Where can you learn more? Go to http://hu-evie.info/. Enter S176 in the search box to learn more about \"Body Mass Index: Care Instructions. \"  Current as of: October 13, 2016  Content Version: 11.4  © 7854-7192 [employer], "[employer].  Care instructions adapted under license by [employer] (which disclaims liability or warranty for this information). If you have questions about a medical condition or this instruction, always ask your healthcare professional. [place] [address] any warranty or liability for your use of this information.

## 2018-02-05 NOTE — ASSESSMENT & PLAN NOTE
Worsening, based on history, physical exam and review of pertinent labs, studies and medications; meds reconciled; continue current treatment plan, lifestyle modifications recommended. Key Obesity Meds     Patient is on no anti-obesity meds.         Lab Results   Component Value Date/Time    Glucose 100 04/11/2017 03:53 PM    Sodium 141 04/11/2017 03:53 PM    Potassium 3.9 04/11/2017 03:53 PM    ALT (SGPT) 15 04/11/2017 03:53 PM    AST (SGOT) 15 04/11/2017 03:53 PM

## 2018-02-05 NOTE — MR AVS SNAPSHOT
[address] [place] [address]       [address] [id number] [place] [address]  [phone]     Patient: Anner Schirmer  MRN: [id number]  :1965               Visit Information     Date & Time Provider Department Dept. Phone Encounter #    2018 11:45 AM Varinder Petersen MD [facility] [phone] [id number]      Follow-up Instructions     Return in about 6 months (around 2018), or if symptoms worsen or fail to improve. Upcoming Health Maintenance        Date Due    Hepatitis C Screening 1965    DTaP/Tdap/Td series (1 - Tdap) 1985    PAP AKA CERVICAL CYTOLOGY 1985    BREAST CANCER SCRN MAMMOGRAM 2014    FOBT Q 1 YEAR AGE 50-75 2014    Influenza Age 9 to Adult 2017      Allergies as of 2018  Review Complete On: 2018 By: Varinder Petersen MD       Severity Noted Reaction Type Reactions    Codeine High   Itching      Current Immunizations  Never Reviewed    No immunizations on file. Not reviewed this visit   You Were Diagnosed With        Codes Comments    Non-rheumatic mitral regurgitation    -  Primary ICD-10-CM: I34.0  ICD-9-CM: 424.0 ;  MILD MR    Nonrheumatic aortic insufficiency with aortic stenosis     ICD-10-CM: I35.2  ICD-9-CM: 424.1  Mild AS/AI. Possible congenital- d/w pt . Will watch clinically for now      Essential hypertension, benign     ICD-10-CM: I10  ICD-9-CM: 401.1 CONTROLLED        Severe obesity (BMI 35.0-39.9) (HCC)     ICD-10-CM: E66.01  ICD-9-CM: 278.01 Weight loss has been strongly encouraged by following dietary restrictions and an exercise routine. Cough     ICD-10-CM: R05  ICD-9-CM: 786.2  poss ace i induced.  change to losartan    Precordial pain     ICD-10-CM: R07.2  ICD-9-CM: 786.51  muscular clinically, recent cough/URI      Vitals     BP Pulse Height(growth percentile) Weight(growth percentile) BMI Smoking Status    134/76 62 5' 9\" (1.753 m) 267 lb (121.1 kg) 39.43 kg/m2 Former Smoker    Vitals History      BMI and BSA Data     Body Mass Index Body Surface Area    39.43 kg/m 2 2.43 m 2         Preferred Pharmacy       Pharmacy Name Phone    [address] [facility][note 3] [facility] [phone]         Your Updated Medication List          This list is accurate as of: 2/5/18 12:28 PM.  Always use your most recent med list.                CORICIDIN HBP COLD AND FLU PO   Take  by mouth. 2 tabs daily       losartan 50 mg tablet   Commonly known as:  COZAAR   Take 1 Tab by mouth daily. sertraline 50 mg tablet   Commonly known as:  ZOLOFT   Take  by mouth daily. zolpidem 10 mg tablet   Commonly known as:  AMBIEN   Take  by mouth nightly as needed for Sleep. Prescriptions Sent to Pharmacy        Refills    losartan (COZAAR) 50 mg tablet 1    Sig: Take 1 Tab by mouth daily. Class: Normal    Ph[facility] [address] [facility] Ph #: [phone]    Route: Oral      Follow-up Instructions     Return in about 6 months (around 8/5/2018), or if symptoms worsen or fail to improve. To-Do List     Around 07/05/2018     Cardiac Services:  2D ECHO COMPLETE ADULT (TTE)          Patient Instructions    Medications Discontinued During This Encounter   Medication Reason    amLODIPine (NORVASC) 5 mg tablet Not A Current Medication    lisinopril (PRINIVIL, ZESTRIL) 20 mg tablet Other       Orders Placed This Encounter    2D ECHO COMPLETE ADULT (TTE)     Standing Status:   Future     Standing Expiration Date:   8/4/2018     Order Specific Question:   Reason for Exam:     Answer:   as/mr    losartan (COZAAR) 50 mg tablet     Sig: Take 1 Tab by mouth daily. Dispense:  90 Tab     Refill:  1          Body Mass Index: Care Instructions  Your Care Instructions    Body mass index (BMI) can help you see if your weight is raising your risk for health problems. It uses a formula to compare how much you weigh with how tall you are.   · A BMI lower than 18.5 is considered underweight. · A BMI between 18.5 and 24.9 is considered healthy. · A BMI between 25 and 29.9 is considered overweight. A BMI of 30 or higher is considered obese. If your BMI is in the normal range, it means that you have a lower risk for weight-related health problems. If your BMI is in the overweight or obese range, you may be at increased risk for weight-related health problems, such as high blood pressure, heart disease, stroke, arthritis or joint pain, and diabetes. If your BMI is in the underweight range, you may be at increased risk for health problems such as fatigue, lower protection (immunity) against illness, muscle loss, bone loss, hair loss, and hormone problems. BMI is just one measure of your risk for weight-related health problems. You may be at higher risk for health problems if you are not active, you eat an unhealthy diet, or you drink too much alcohol or use tobacco products. Follow-up care is a key part of your treatment and safety. Be sure to make and go to all appointments, and call your doctor if you are having problems. It's also a good idea to know your test results and keep a list of the medicines you take. How can you care for yourself at home? · Practice healthy eating habits. This includes eating plenty of fruits, vegetables, whole grains, lean protein, and low-fat dairy. · If your doctor recommends it, get more exercise. Walking is a good choice. Bit by bit, increase the amount you walk every day. Try for at least 30 minutes on most days of the week. · Do not smoke. Smoking can increase your risk for health problems. If you need help quitting, talk to your doctor about stop-smoking programs and medicines. These can increase your chances of quitting for good. · Limit alcohol to 2 drinks a day for men and 1 drink a day for women. Too much alcohol can cause health problems.   If you have a BMI higher than 25  · Your doctor may do other tests to check your risk for weight-related health problems. This may include measuring the distance around your waist. A waist measurement of more than 40 inches in men or 35 inches in women can increase the risk of weight-related health problems. · Talk with your doctor about steps you can take to stay healthy or improve your health. You may need to make lifestyle changes to lose weight and stay healthy, such as changing your diet and getting regular exercise. If you have a BMI lower than 18.5  · Your doctor may do other tests to check your risk for health problems. · Talk with your doctor about steps you can take to stay healthy or improve your health. You may need to make lifestyle changes to gain or maintain weight and stay healthy, such as getting more healthy foods in your diet and doing exercises to build muscle. Where can you learn more? Go to http://hu-evie.info/. Enter S176 in the search box to learn more about \"Body Mass Index: Care Instructions. \"  Current as of: October 13, 2016  Content Version: 11.4  © 0448-5670 [employer]. Care instructions adapted under license by [employer] (which disclaims liability or warranty for this information). If you have questions about a medical condition or this instruction, always ask your healthcare professional. [place] [address] any warranty or liability for your use of this information. Introducing [facility]! [facility] introduces [employer] patient portal. Now you can access parts of your medical record, email your doctor's office, and request medication refills online. 1. In your internet browser, go to [email or web address]   2. Click on the First Time User? Click Here link in the Sign In box. You will see the New Member Sign Up page. 3. Enter your [employer] Access Code exactly as it appears below.  You will not need to use this code after youve completed the sign-up process. If you do not sign up before the expiration date, you must request a new code. · SendHub Access Code: [email or web address]  Expires: 5/6/2018 11:42 AM    4. Enter the last four digits of your Social Security Number (xxxx) and Date of Birth (mm/dd/yyyy) as indicated and click Submit. You will be taken to the next sign-up page. 5. Create a SendHub ID. This will be your SendHub login ID and cannot be changed, so think of one that is secure and easy to remember. 6. Create a SendHub password. You can change your password at any time. 7. Enter your Password Reset Question and Answer. This can be used at a later time if you forget your password. 8. Enter your e-mail address. You will receive e-mail notification when new information is available in [address] 9. Click Sign Up. You can now view and download portions of your medical record. 10. Click the Download Summary menu link to download a portable copy of your medical information. If you have questions, please visit the Frequently Asked Questions section of the SendHub website. Remember, SendHub is NOT to be used for urgent needs. For medical emergencies, dial 911. Now available from your iPhone and Android! Please provide this summary of care documentation to your next provider. Your primary care clinician is listed as Nadia Cervantes. If you have any questions after today's visit, please call [phone].

## 2018-02-05 NOTE — PROGRESS NOTES
HISTORY OF PRESENT ILLNESS  Hilton Patino is a 47 y.o. female. HPI Comments: 4/17 Seen after >3 yrs. Has HTN more than usual for last 1 wk    Valvular Heart Disease   The history is provided by the medical records (AS/AI/MR). Pertinent negatives include no chest pain, no headaches and no shortness of breath. Hypertension   The history is provided by the medical records and patient. This is a chronic problem. Pertinent negatives include no chest pain, no headaches and no shortness of breath. Palpitations    The history is provided by the patient. This is a new problem. The current episode started more than 2 days ago (4/2/17). The problem has not changed since onset. The problem occurs every several days (no falls/LOC). The problem is associated with nothing. Associated symptoms include lower extremity edema. Pertinent negatives include no diaphoresis, no fever, no malaise/fatigue, no chest pain, no claudication, no near-syncope, no orthopnea, no PND, no nausea, no vomiting, no headaches, no dizziness, no cough and no shortness of breath. Her past medical history is significant for hypertension. Leg Swelling   The history is provided by the patient. This is a new problem. The current episode started more than 1 week ago. The problem occurs every several days. The problem has been gradually improving. Pertinent negatives include no chest pain, no headaches and no shortness of breath. The symptoms are aggravated by standing. The symptoms are relieved by sleep. Chest Pain (Angina)    The history is provided by the patient. This is a new problem. The current episode started more than 1 week ago. The problem occurs daily (has URI symptoms). The pain is present in the lateral region and left side. Associated symptoms include lower extremity edema and palpitations.  Pertinent negatives include no claudication, no cough, no diaphoresis, no dizziness, no fever, no headaches, no malaise/fatigue, no nausea, no near-syncope, no orthopnea, no PND, no shortness of breath and no vomiting. Review of Systems   Constitutional: Negative for chills, diaphoresis, fever, malaise/fatigue and weight loss. HENT: Negative for nosebleeds. Eyes: Negative for discharge. Respiratory: Negative for cough, shortness of breath and wheezing. Cardiovascular: Positive for palpitations and leg swelling. Negative for chest pain, orthopnea, claudication, PND and near-syncope. Gastrointestinal: Negative for diarrhea, nausea and vomiting. Genitourinary: Negative for dysuria and hematuria. Musculoskeletal: Negative for joint pain. Skin: Negative for rash. Neurological: Negative for dizziness, seizures, loss of consciousness and headaches. Endo/Heme/Allergies: Negative for polydipsia. Does not bruise/bleed easily. Psychiatric/Behavioral: Negative for depression and substance abuse. The patient does not have insomnia. Allergies   Allergen Reactions    Codeine Itching       Past Medical History:   Diagnosis Date    Aortic valve disorders     Degenerative joint disease     Essential hypertension, benign     CONTROLLED     Lumbar herniated disc     Mitral valve disorders(424.0)      MILD MR     Obesity, unspecified     Transfusion history     PATIENT HAS A HX OF RECEIVING BLOOD OR BLOOD PRODUCT TRANSFUSION(S)       Family History   Problem Relation Age of Onset    Heart Disease Mother 48      WITH MI    Heart Attack Mother     Heart Surgery Mother     Heart Attack Father        Social History   Substance Use Topics    Smoking status: Former Smoker     Quit date: 2015    Smokeless tobacco: Never Used      Comment: SMOKED FOR LESS THAN 5 YEARS MANY YEARS AGO    Alcohol use 0.6 oz/week     1 Glasses of wine per week        Current Outpatient Prescriptions   Medication Sig    lisinopril (PRINIVIL, ZESTRIL) 20 mg tablet Take  by mouth daily.     ACETAMINOPHEN/CHLORPHENIRAMINE (CORICIDIN HBP COLD AND FLU PO) Take  by mouth. 2 tabs daily    sertraline (ZOLOFT) 50 mg tablet Take  by mouth daily.  zolpidem (AMBIEN) 10 mg tablet Take  by mouth nightly as needed for Sleep. No current facility-administered medications for this visit. Past Surgical History:   Procedure Laterality Date    HX HERNIA REPAIR      HX LAP CHOLECYSTECTOMY      HX TUBAL LIGATION         Visit Vitals    /76    Pulse 62    Ht 5' 9\" (1.753 m)    Wt 267 lb (121.1 kg)    BMI 39.43 kg/m2       Diagnostic Studies:  I have reviewed the relevant tests done on the patient and show as follows  EKG tracings reviewed by me today. CARDIOLOGY STUDIES 8/1/2009 12/1/2004 3/1/2001   Myocardial Perfusion Scan Result - - ? NL SCAN, MILD FIXED ANTERIOR DEFECT, MILD CMP, EF 34%   Echocardiogram - Complete Result EF 60%, TRACE MR, MILD AI EF 60%, MILD MVP, MILD MR, MILD AI -   24 hr Holter Monitor Result - NO ARRHYTHMIAS -   Some recent data might be hidden   4/17 Holter  SR, rare PAC, Possible Occasional junctional tachy with Sx    4/17 ECHO  SUMMARY:  Left ventricle: Systolic function was normal. Ejection fraction was  estimated in the range of 55 % to 60 %. There were no regional wall motion  abnormalities. There was mild concentric hypertrophy. Features were  consistent with a pseudonormal left ventricular filling pattern, with  concomitant abnormal relaxation and increased filling pressure (grade 2  diastolic dysfunction). Left atrium: The atrium was moderately dilated. Mitral valve: There was mild to moderate annular calcification. There was  mild regurgitation. Aortic valve: The valve was probably trileaflet. Leaflets exhibited mildly  to moderately increased thickness, mild calcification, and mildly reduced  cuspal separation. There was mild stenosis. The regurgitant jet was  eccentric. Valve mean gradient was 17.73 mmHg. COMPARISONS:  Comparison was made with the previous study of 30-Dec-2004.  Aortic  stenosis has worsened and regurgitation has worsened. Otherwise no  significant change. Ms. Suzie Wright has a reminder for a \"due or due soon\" health maintenance. I have asked that she contact her primary care provider for follow-up on this health maintenance. Physical Exam   Constitutional: She is oriented to person, place, and time. She appears well-developed and well-nourished. No distress. sev obese   HENT:   Head: Normocephalic and atraumatic. Mouth/Throat: Normal dentition. Eyes: Right eye exhibits no discharge. Left eye exhibits no discharge. No scleral icterus. Neck: Neck supple. No JVD present. Carotid bruit is not present. No thyromegaly present. Cardiovascular: Normal rate, regular rhythm, S1 normal, S2 normal and intact distal pulses. Exam reveals no gallop and no friction rub. Murmur heard. High-pitched blowing midsystolic murmur is present with a grade of 3/6  at the apex   Harsh early systolic murmur of grade 2/6 is also present at the upper right sternal border. Pulmonary/Chest: Effort normal and breath sounds normal. She has no wheezes. She has no rales. She exhibits tenderness (reproducible left precordial). Abdominal: Soft. She exhibits no mass. There is no tenderness. Musculoskeletal: She exhibits no edema. Lymphadenopathy:        Right cervical: No superficial cervical adenopathy present. Left cervical: No superficial cervical adenopathy present. Neurological: She is alert and oriented to person, place, and time. Skin: Skin is warm and dry. No rash noted. Psychiatric: She has a normal mood and affect. Her behavior is normal.       ASSESSMENT and PLAN          Diagnoses and all orders for this visit:    1. Non-rheumatic mitral regurgitation  Comments:  4/17; 8/09 MILD MR  Orders:  -     2D ECHO COMPLETE ADULT (TTE); Future    2. Nonrheumatic aortic insufficiency with aortic stenosis  Comments:  4/17 Mild AS/AI. Possible congenital- d/w pt .  Will watch clinically for now    Orders:  -     2D ECHO COMPLETE ADULT (TTE); Future    3. Essential hypertension, benign  Comments:  CONTROLLED      Orders:  -     losartan (COZAAR) 50 mg tablet; Take 1 Tab by mouth daily. 4. Severe obesity (BMI 35.0-39.9) ([address]  Comments:  Weight loss has been strongly encouraged by following dietary restrictions and an exercise routine. Assessment & Plan:  Worsening, based on history, physical exam and review of pertinent labs, studies and medications; meds reconciled; continue current treatment plan, lifestyle modifications recommended. Key Obesity Meds     Patient is on no anti-obesity meds. Lab Results   Component Value Date/Time    Glucose 100 04/11/2017 03:53 PM    Sodium 141 04/11/2017 03:53 PM    Potassium 3.9 04/11/2017 03:53 PM    ALT (SGPT) 15 04/11/2017 03:53 PM    AST (SGOT) 15 04/11/2017 03:53 PM             5. Cough  Comments:  2/18 poss ace i induced. change to losartan  Orders:  -     losartan (COZAAR) 50 mg tablet; Take 1 Tab by mouth daily. 6. Precordial pain  Comments:  2/18 muscular clinically, recent cough/URI      Pertinent laboratory and test data reviewed and discussed with patient. See patient instructions also for other medical advice given    Medications Discontinued During This Encounter   Medication Reason    amLODIPine (NORVASC) 5 mg tablet Not A Current Medication    lisinopril (PRINIVIL, ZESTRIL) 20 mg tablet Other       Follow-up Disposition:  Return in about 6 months (around 8/5/2018), or if symptoms worsen or fail to improve.

## 2018-02-05 NOTE — LETTER
Alexus Doyle  1965 2/5/2018      Dear Emilia Ying NP    I had the pleasure of evaluating  Ms. Dong Aragon in office today. Below are the relevant portions of my assessment and plan of care. ICD-10-CM ICD-9-CM    1. Non-rheumatic mitral regurgitation I34.0 424.0 2D ECHO COMPLETE ADULT (TTE)    4/17; 8/09 MILD MR   2. Nonrheumatic aortic insufficiency with aortic stenosis I35.2 424.1 2D ECHO COMPLETE ADULT (TTE)    4/17 Mild AS/AI. Possible congenital- d/w pt . Will watch clinically for now     3. Essential hypertension, benign I10 401.1 losartan (COZAAR) 50 mg tablet    CONTROLLED       4. Severe obesity (BMI 35.0-39.9) ([facility]) E66.01 278.01     Weight loss has been strongly encouraged by following dietary restrictions and an exercise routine. 5. Cough R05 786.2 losartan (COZAAR) 50 mg tablet    2/18 poss ace i induced. change to losartan   6. Precordial pain R07.2 786.51     2/18 muscular clinically, recent cough/URI       Current Outpatient Prescriptions   Medication Sig Dispense Refill    ACETAMINOPHEN/CHLORPHENIRAMINE (CORICIDIN HBP COLD AND FLU PO) Take  by mouth. 2 tabs daily      losartan (COZAAR) 50 mg tablet Take 1 Tab by mouth daily. 90 Tab 1    sertraline (ZOLOFT) 50 mg tablet Take  by mouth daily.  zolpidem (AMBIEN) 10 mg tablet Take  by mouth nightly as needed for Sleep. Orders Placed This Encounter    2D ECHO COMPLETE ADULT (TTE)     Standing Status:   Future     Standing Expiration Date:   8/4/2018     Order Specific Question:   Reason for Exam:     Answer:   as/mr    DISCONTD: lisinopril (PRINIVIL, ZESTRIL) 20 mg tablet     Sig: Take  by mouth daily.  ACETAMINOPHEN/CHLORPHENIRAMINE (CORICIDIN HBP COLD AND FLU PO)     Sig: Take  by mouth. 2 tabs daily    losartan (COZAAR) 50 mg tablet     Sig: Take 1 Tab by mouth daily. Dispense:  90 Tab     Refill:  1     If you have questions, please do not hesitate to call me.   I look forward to following Ms. Hung along with you.     Sincerely,  Saman Smith MD

## 2018-04-13 RX ORDER — ZOLPIDEM TARTRATE 10 MG/1
TABLET ORAL
OUTPATIENT
Start: 2018-04-13

## 2018-04-13 NOTE — TELEPHONE ENCOUNTER
Informed patient of medication refusal by Dr Didi Morrison, patient expressed understanding of reason

## 2018-04-13 NOTE — TELEPHONE ENCOUNTER
Patient would like to know if you would refill medication just this one time. It was originally prescribed by her OB/GYN doctor, she states she has an appointment in May. Due to past insurance conflicts, it has hinder her from staying on track with her regular maintenance schedule and they're refusing to refill medication until her appointment in May. She's recently been seen by you.

## 2018-08-10 ENCOUNTER — OFFICE VISIT (OUTPATIENT)
Dept: CARDIOLOGY CLINIC | Age: 53
End: 2018-08-10

## 2018-08-10 VITALS
HEIGHT: 69 IN | SYSTOLIC BLOOD PRESSURE: 122 MMHG | HEART RATE: 62 BPM | WEIGHT: 277 LBS | DIASTOLIC BLOOD PRESSURE: 62 MMHG | BODY MASS INDEX: 41.03 KG/M2

## 2018-08-10 DIAGNOSIS — I35.2 NONRHEUMATIC AORTIC INSUFFICIENCY WITH AORTIC STENOSIS: ICD-10-CM

## 2018-08-10 DIAGNOSIS — R05.9 COUGH: ICD-10-CM

## 2018-08-10 DIAGNOSIS — I34.0 NON-RHEUMATIC MITRAL REGURGITATION: ICD-10-CM

## 2018-08-10 DIAGNOSIS — R07.2 PRECORDIAL PAIN: ICD-10-CM

## 2018-08-10 DIAGNOSIS — E66.01 SEVERE OBESITY (BMI 35.0-39.9): ICD-10-CM

## 2018-08-10 DIAGNOSIS — R00.2 PALPITATIONS: ICD-10-CM

## 2018-08-10 DIAGNOSIS — I10 ESSENTIAL HYPERTENSION, BENIGN: Primary | ICD-10-CM

## 2018-08-10 RX ORDER — GABAPENTIN 600 MG/1
TABLET ORAL DAILY
COMMUNITY

## 2018-08-10 NOTE — PROGRESS NOTES
1. Have you been to the ER, urgent care clinic since your last visit? Hospitalized since your last visit? No    2. Have you seen or consulted any other health care providers outside of the [address] [place] [address] since your last visit? Include any pap smears or colon screening. No     3. Since your last visit, have you had any of the following symptoms? .           4. Have you had any blood work, X-rays or cardiac testing? No        5. Where do you normally have your labs drawn? PCP    6. Do you need any refills today?    No

## 2018-08-10 NOTE — PROGRESS NOTES
HISTORY OF PRESENT ILLNESS  Dorothy Shafer is a 47 y.o. female. HPI Comments: 4/17 Seen after >3 yrs. Has HTN more than usual for last 1 wk    Hypertension   The history is provided by the medical records and patient. This is a chronic problem. Pertinent negatives include no chest pain, no headaches and no shortness of breath. Valvular Heart Disease   The history is provided by the medical records (AS/AI/MR). Pertinent negatives include no chest pain, no headaches and no shortness of breath. Palpitations    The history is provided by the patient. This is a new problem. The current episode started more than 1 week ago (4/2/17). The problem has been rapidly improving. The problem occurs rarely (no falls/LOC; once in 1-2 wks). The problem is associated with nothing. Associated symptoms include lower extremity edema. Pertinent negatives include no diaphoresis, no fever, no malaise/fatigue, no chest pain, no claudication, no near-syncope, no orthopnea, no PND, no nausea, no vomiting, no headaches, no dizziness, no cough and no shortness of breath. Her past medical history is significant for hypertension. Leg Swelling   The history is provided by the patient. This is a new problem. The current episode started more than 1 week ago. The problem occurs every several days. The problem has been gradually improving. Pertinent negatives include no chest pain, no headaches and no shortness of breath. The symptoms are aggravated by standing. The symptoms are relieved by sleep. Chest Pain (Angina)    The history is provided by the patient. This is a new problem. The current episode started more than 1 week ago. The problem has been resolved. The problem occurs daily (has URI symptoms). The pain is present in the lateral region and left side. Associated symptoms include lower extremity edema and palpitations.  Pertinent negatives include no claudication, no cough, no diaphoresis, no dizziness, no fever, no headaches, no malaise/fatigue, no nausea, no near-syncope, no orthopnea, no PND, no shortness of breath and no vomiting. Review of Systems   Constitutional: Negative for chills, diaphoresis, fever, malaise/fatigue and weight loss. HENT: Negative for nosebleeds. Eyes: Negative for discharge. Respiratory: Negative for cough, shortness of breath and wheezing. Cardiovascular: Positive for palpitations and leg swelling. Negative for chest pain, orthopnea, claudication, PND and near-syncope. Gastrointestinal: Negative for diarrhea, nausea and vomiting. Genitourinary: Negative for dysuria and hematuria. Musculoskeletal: Negative for joint pain. Skin: Negative for rash. Neurological: Negative for dizziness, seizures, loss of consciousness and headaches. Endo/Heme/Allergies: Negative for polydipsia. Does not bruise/bleed easily. Psychiatric/Behavioral: Negative for depression and substance abuse. The patient does not have insomnia. Allergies   Allergen Reactions    Codeine Itching       Past Medical History:   Diagnosis Date    Aortic valve disorders     Cough 2018 poss ace i induced.  change to losartan    Degenerative joint disease     Essential hypertension, benign     CONTROLLED     Lumbar herniated disc     Mitral valve disorders(424.0)      MILD MR     Obesity, unspecified     Transfusion history     PATIENT HAS A HX OF RECEIVING BLOOD OR BLOOD PRODUCT TRANSFUSION(S)       Family History   Problem Relation Age of Onset    Heart Disease Mother 48      WITH MI    Heart Attack Mother     Heart Surgery Mother     Heart Attack Father        Social History   Substance Use Topics    Smoking status: Former Smoker     Quit date: 2015    Smokeless tobacco: Never Used      Comment: SMOKED FOR LESS THAN 5 YEARS MANY YEARS AGO    Alcohol use 0.6 oz/week     1 Glasses of wine per week        Current Outpatient Prescriptions   Medication Sig    gabapentin (NEURONTIN) 600 mg tablet Take  by mouth daily.  ACETAMINOPHEN/CHLORPHENIRAMINE (CORICIDIN HBP COLD AND FLU PO) Take  by mouth as needed. 2 tabs daily     losartan (COZAAR) 50 mg tablet Take 1 Tab by mouth daily.  zolpidem (AMBIEN) 10 mg tablet Take  by mouth nightly as needed for Sleep. No current facility-administered medications for this visit. Past Surgical History:   Procedure Laterality Date    HX HERNIA REPAIR      HX LAP CHOLECYSTECTOMY      HX TUBAL LIGATION         Visit Vitals    /62    Pulse 62    Ht 5' 9\" (1.753 m)    Wt 277 lb (125.6 kg)    BMI 40.91 kg/m2       Diagnostic Studies:  I have reviewed the relevant tests done on the patient and show as follows  EKG tracings reviewed by me today. CARDIOLOGY STUDIES 8/1/2009 12/1/2004 3/1/2001   Myocardial Perfusion Scan Result - - ? NL SCAN, MILD FIXED ANTERIOR DEFECT, MILD CMP, EF 34%   Echocardiogram - Complete Result EF 60%, TRACE MR, MILD AI EF 60%, MILD MVP, MILD MR, MILD AI -   24 hr Holter Monitor Result - NO ARRHYTHMIAS -   Some recent data might be hidden   4/17 Holter  SR, rare PAC, Possible Occasional junctional tachy with Sx    7/18 ECHO    Interpretation Summary        · Calculated left ventricular ejection fraction is 65%. Left ventricular mild concentric hypertrophy observed. Normal left ventricular wall motion, no regional wall motion abnormality noted. Moderate (grade 2) left ventricular diastolic dysfunction. · Left atrial cavity size is severely dilated. · LA index is 51.71 ml/m2. · Aortic valve is probably trileaflet. Mild aortic valve leaflet calcification present with reduced excursion. Mild aortic valve stenosis is present. Mild aortic valve regurgitation is present. · Mild mitral annular calcification. Mild mitral valve regurgitation.             Comparison Study Information          Prior Study        When compared to the previous study from 4/17/17, the left atrial size appears to have increased.           Ms. Tanisha Carreno has a reminder for a \"due or due soon\" health maintenance. I have asked that she contact her primary care provider for follow-up on this health maintenance. Physical Exam   Constitutional: She is oriented to person, place, and time. She appears well-developed and well-nourished. No distress. sev obese   HENT:   Head: Normocephalic and atraumatic. Mouth/Throat: Normal dentition. Eyes: Right eye exhibits no discharge. Left eye exhibits no discharge. No scleral icterus. Neck: Neck supple. No JVD present. Carotid bruit is not present. No thyromegaly present. Cardiovascular: Normal rate, regular rhythm, S1 normal, S2 normal and intact distal pulses. Exam reveals no gallop and no friction rub. Murmur heard. High-pitched blowing midsystolic murmur is present with a grade of 3/6  at the apex   Harsh early systolic murmur of grade 2/6 is also present at the upper right sternal border. Pulmonary/Chest: Effort normal and breath sounds normal. She has no wheezes. She has no rales. She exhibits no tenderness. Abdominal: Soft. She exhibits no mass. There is no tenderness. Musculoskeletal: She exhibits no edema. Lymphadenopathy:        Right cervical: No superficial cervical adenopathy present. Left cervical: No superficial cervical adenopathy present. Neurological: She is alert and oriented to person, place, and time. Skin: Skin is warm and dry. No rash noted. Psychiatric: She has a normal mood and affect. Her behavior is normal.       ASSESSMENT and PLAN      Chest pain: 8/18 resolved; 2/18 muscular clinically, recent cough/URI  Palpitations: Improved, likely as she has started gabapentin. 1500 leta menu   Aortic stenosis, aortic incompetence and mitral regurgitation is stable as of echo in July 2018. Detailed discussion about weight loss. A sample menu will be given. She will read up on Mediterranean diet.   Cough has improved since ACE inhibitors were switched to ARB's.    Diagnoses and all orders for this visit:    1. Essential hypertension, benign    2. Non-rheumatic mitral regurgitation    3. Nonrheumatic aortic insufficiency with aortic stenosis    4. Severe obesity (BMI 35.0-39.9) ([facility])    5. Palpitations    6. Precordial pain    7. Cough        Pertinent laboratory and test data reviewed and discussed with patient. See patient instructions also for other medical advice given    Medications Discontinued During This Encounter   Medication Reason    sertraline (ZOLOFT) 50 mg tablet Not A Current Medication       Follow-up Disposition:  Return in about 6 months (around 2/10/2019), or if symptoms worsen or fail to improve.

## 2018-08-10 NOTE — PATIENT INSTRUCTIONS
Medications Discontinued During This Encounter   Medication Reason    sertraline (ZOLOFT) 50 mg tablet Not A Current Medication          Learning About the Mediterranean Diet  What is the Mediterranean diet? The Mediterranean diet is a style of eating rather than a diet plan. It features foods eaten in [place], [place], [place] and [place], and other countries along the . It emphasizes eating foods like fish, fruits, vegetables, beans, high-fiber breads and whole grains, nuts, and olive oil. This style of eating includes limited red meat, cheese, and sweets. Why choose the Mediterranean diet? A Mediterranean-style diet may improve heart health. It contains more fat than other heart-healthy diets. But the fats are mainly from nuts, unsaturated oils (such as fish oils and olive oil), and certain nut or seed oils (such as canola, soybean, or flaxseed oil). These fats may help protect the heart and blood vessels. How can you get started on the Mediterranean diet? Here are some things you can do to switch to a more Mediterranean way of eating. What to eat  · Eat a variety of fruits and vegetables each day, such as grapes, blueberries, tomatoes, broccoli, peppers, figs, olives, spinach, eggplant, beans, lentils, and chickpeas. · Eat a variety of whole-grain foods each day, such as oats, brown rice, and whole wheat bread, pasta, and couscous. · Eat fish at least 2 times a week. Try tuna, salmon, mackerel, lake trout, herring, or sardines. · Eat moderate amounts of low-fat dairy products, such as milk, cheese, or yogurt. · Eat moderate amounts of poultry and eggs. · Choose healthy (unsaturated) fats, such as nuts, olive oil, and certain nut or seed oils like canola, soybean, and flaxseed. · Limit unhealthy (saturated) fats, such as butter, palm oil, and coconut oil. And limit fats found in animal products, such as meat and dairy products made with whole milk.  Try to eat red meat only a few times a month in very small amounts. · Limit sweets and desserts to only a few times a week. This includes sugar-sweetened drinks like soda. The Mediterranean diet may also include red wine with your meal-1 glass each day for women and up to 2 glasses a day for men. Tips for eating at home  · Use herbs, spices, garlic, lemon zest, and citrus juice instead of salt to add flavor to foods. · Add avocado slices to your sandwich instead of curran. · Have fish for lunch or dinner instead of red meat. Brush the fish with olive oil, and broil or grill it. · Sprinkle your salad with seeds or nuts instead of cheese. · Cook with olive or canola oil instead of butter or oils that are high in saturated fat. · Switch from 2% milk or whole milk to 1% or fat-free milk. · Dip raw vegetables in a vinaigrette dressing or hummus instead of dips made from mayonnaise or sour cream.  · Have a piece of fruit for dessert instead of a piece of cake. Try baked apples, or have some dried fruit. Tips for eating out  · Try broiled, grilled, baked, or poached fish instead of having it fried or breaded. · Ask your  to have your meals prepared with olive oil instead of butter. · Order dishes made with marinara sauce or sauces made from olive oil. Avoid sauces made from cream or mayonnaise. · Choose whole-grain breads, whole wheat pasta and pizza crust, brown rice, beans, and lentils. · Cut back on butter or margarine on bread. Instead, you can dip your bread in a small amount of olive oil. · Ask for a side salad or grilled vegetables instead of french fries or chips. Where can you learn more? Go to http://hu-evie.info/. Enter 440-238-7658 in the search box to learn more about \"Learning About the Mediterranean Diet. \"  Current as of: May 12, 2017  Content Version: 11.7  © 1622-2728 [employer].  Care instructions adapted under license by [employer] (which disclaims liability or warranty for this information). If you have questions about a medical condition or this instruction, always ask your healthcare professional. [place] [address] any warranty or liability for your use of this information.

## 2018-08-10 NOTE — LETTER
Ben Arevalo  1965    8/10/2018      Dear Bianka Padilla NP    I had the pleasure of evaluating  Ms. Génesis Jean-Baptiste in office today. Below are the relevant portions of my assessment and plan of care. ICD-10-CM ICD-9-CM    1. Essential hypertension, benign I10 401.1    2. Non-rheumatic mitral regurgitation I34.0 424.0    3. Nonrheumatic aortic insufficiency with aortic stenosis I35.2 424.1    4. Severe obesity (BMI 35.0-39.9) ([facility]) E66.01 278.01    5. Palpitations R00.2 785.1    6. Precordial pain R07.2 786.51    7. Cough R05 786.2        Current Outpatient Prescriptions   Medication Sig Dispense Refill    gabapentin (NEURONTIN) 600 mg tablet Take  by mouth daily.  ACETAMINOPHEN/CHLORPHENIRAMINE (CORICIDIN HBP COLD AND FLU PO) Take  by mouth as needed. 2 tabs daily       losartan (COZAAR) 50 mg tablet Take 1 Tab by mouth daily. 90 Tab 1    zolpidem (AMBIEN) 10 mg tablet Take  by mouth nightly as needed for Sleep. Orders Placed This Encounter    gabapentin (NEURONTIN) 600 mg tablet     Sig: Take  by mouth daily. If you have questions, please do not hesitate to call me. I look forward to following Ms. Persaud along with you.     Sincerely,  Victoriano Wright MD

## 2018-08-12 DIAGNOSIS — R05.9 COUGH: ICD-10-CM

## 2018-08-12 DIAGNOSIS — I10 ESSENTIAL HYPERTENSION, BENIGN: ICD-10-CM

## 2018-08-13 RX ORDER — LOSARTAN POTASSIUM 50 MG/1
TABLET ORAL
Qty: 90 TAB | Refills: 1 | Status: SHIPPED | OUTPATIENT
Start: 2018-08-13

## 2021-09-27 NOTE — MR AVS SNAPSHOT
[address] [place] [address]       [address] [id number] [place] [address]  [phone]     Patient: Jerry Mccabe  MRN: [id number]  :1965               Visit Information     Date & Time Provider Department Dept. Phone Encounter #    8/10/2018 10:30 AM Kari Biggs MD [facility] [phone] [id number]      Follow-up Instructions     Return in about 6 months (around 2/10/2019), or if symptoms worsen or fail to improve. Upcoming Health Maintenance        Date Due    Hepatitis C Screening 1965    DTaP/Tdap/Td series (1 - Tdap) 1985    PAP AKA CERVICAL CYTOLOGY 1985    BREAST CANCER SCRN MAMMOGRAM 2014    FOBT Q 1 YEAR AGE 50-75 2014    Influenza Age 9 to Adult 2018      Allergies as of 8/10/2018  Review Complete On: 8/10/2018 By: Kari Biggs MD       Severity Noted Reaction Type Reactions    Codeine High   Itching      Current Immunizations  Never Reviewed    No immunizations on file. Not reviewed this visit   You Were Diagnosed With        Codes Comments    Essential hypertension, benign    -  Primary ICD-10-CM: I10  ICD-9-CM: 401.1     Non-rheumatic mitral regurgitation     ICD-10-CM: I34.0  ICD-9-CM: 424.0     Nonrheumatic aortic insufficiency with aortic stenosis     ICD-10-CM: I35.2  ICD-9-CM: 424.1     Severe obesity (BMI 35.0-39.9) ([facility]     ICD-10-CM: E66.01  ICD-9-CM: 278.01     Palpitations     ICD-10-CM: R00.2  ICD-9-CM: 785.1     Precordial pain     ICD-10-CM: R07.2  ICD-9-CM: 786.51     Cough     ICD-10-CM: R05  ICD-9-CM: 804. 2       Vitals     BP Pulse Height(growth percentile) Weight(growth percentile) BMI Smoking Status    122/62 62 5' 9\" (1.753 m) 277 lb (125.6 kg) 40.91 kg/m2 Former Smoker    Vitals History      BMI and BSA Data     Body Mass Index Body Surface Area    40.91 kg/m 2 2.47 m 2         Preferred Pharmacy       Pharmacy Name Phone    [address] [place] [phone] Your Updated Medication List          This list is accurate as of 8/10/18 11:50 AM.  Always use your most recent med list.                CORICIDIN HBP COLD AND FLU PO   Take  by mouth as needed. 2 tabs daily       gabapentin 600 mg tablet   Commonly known as:  NEURONTIN   Take  by mouth daily. losartan 50 mg tablet   Commonly known as:  COZAAR   Take 1 Tab by mouth daily. zolpidem 10 mg tablet   Commonly known as:  AMBIEN   Take  by mouth nightly as needed for Sleep. Follow-up Instructions     Return in about 6 months (around 2/10/2019), or if symptoms worsen or fail to improve. Patient Instructions    Medications Discontinued During This Encounter   Medication Reason    sertraline (ZOLOFT) 50 mg tablet Not A Current Medication          Learning About the Mediterranean Diet  What is the Mediterranean diet? The Mediterranean diet is a style of eating rather than a diet plan. It features foods eaten in [place], [place], [place] and [place], and other countries along the [facility]. It emphasizes eating foods like fish, fruits, vegetables, beans, high-fiber breads and whole grains, nuts, and olive oil. This style of eating includes limited red meat, cheese, and sweets. Why choose the Mediterranean diet? A Mediterranean-style diet may improve heart health. It contains more fat than other heart-healthy diets. But the fats are mainly from nuts, unsaturated oils (such as fish oils and olive oil), and certain nut or seed oils (such as canola, soybean, or flaxseed oil). These fats may help protect the heart and blood vessels. How can you get started on the Mediterranean diet? Here are some things you can do to switch to a more Mediterranean way of eating. What to eat  · Eat a variety of fruits and vegetables each day, such as grapes, blueberries, tomatoes, broccoli, peppers, figs, olives, spinach, eggplant, beans, lentils, and chickpeas.   · Eat a variety of [> 3 months] : more  than 3 months whole-grain foods each day, such as oats, brown rice, and whole wheat bread, pasta, and couscous. · Eat fish at least 2 times a week. Try tuna, salmon, mackerel, lake trout, herring, or sardines. · Eat moderate amounts of low-fat dairy products, such as milk, cheese, or yogurt. · Eat moderate amounts of poultry and eggs. · Choose healthy (unsaturated) fats, such as nuts, olive oil, and certain nut or seed oils like canola, soybean, and flaxseed. · Limit unhealthy (saturated) fats, such as butter, palm oil, and coconut oil. And limit fats found in animal products, such as meat and dairy products made with whole milk. Try to eat red meat only a few times a month in very small amounts. · Limit sweets and desserts to only a few times a week. This includes sugar-sweetened drinks like soda. The Mediterranean diet may also include red wine with your meal-1 glass each day for women and up to 2 glasses a day for men. Tips for eating at home  · Use herbs, spices, garlic, lemon zest, and citrus juice instead of salt to add flavor to foods. · Add avocado slices to your sandwich instead of curran. · Have fish for lunch or dinner instead of red meat. Brush the fish with olive oil, and broil or grill it. · Sprinkle your salad with seeds or nuts instead of cheese. · Cook with olive or canola oil instead of butter or oils that are high in saturated fat. · Switch from 2% milk or whole milk to 1% or fat-free milk. · Dip raw vegetables in a vinaigrette dressing or hummus instead of dips made from mayonnaise or sour cream.  · Have a piece of fruit for dessert instead of a piece of cake. Try baked apples, or have some dried fruit. Tips for eating out  · Try broiled, grilled, baked, or poached fish instead of having it fried or breaded. · Ask your  to have your meals prepared with olive oil instead of butter. · Order dishes made with marinara sauce or sauces made from olive oil.  Avoid sauces made from cream or [FreeTextEntry1] : neck and posterior headaches and pain.  mayonnaise. · Choose whole-grain breads, whole wheat pasta and pizza crust, brown rice, beans, and lentils. · Cut back on butter or margarine on bread. Instead, you can dip your bread in a small amount of olive oil. · Ask for a side salad or grilled vegetables instead of french fries or chips. Where can you learn more? Go to http://hu-evie.info/. Enter [phone] in the search box to learn more about \"Learning About the Mediterranean Diet. \"  Current as of: May 12, 2017  Content Version: 11.7  © 1515-1756 [employer]. Care instructions adapted under license by [employer] (which disclaims liability or warranty for this information). If you have questions about a medical condition or this instruction, always ask your healthcare professional. [address] any warranty or liability for your use of this information. Introducing [facility]! Daysi Adams introduces "[employer] patient portal. Now you can access parts of your medical record, email your doctor's office, and request medication refills online. 1. In your internet browser, go to [email or web address]   2. Click on the First Time User? Click Here link in the Sign In box. You will see the New Member Sign Up page. 3. Enter your "[employer] Access Code exactly as it appears below. You will not need to use this code after youve completed the sign-up process. If you do not sign up before the expiration date, you must request a new code. · "[employer] Access Code: [email or web address]  Expires: 10/7/2018 11:22 AM    4. Enter the last four digits of your Social Security Number (xxxx) and Date of Birth (mm/dd/yyyy) as indicated and click Submit. You will be taken to the next sign-up page. 5. Create a [employer] ID. This will be your "[employer] login ID and cannot be changed, so think of one that is secure and easy to remember. 6. Create a "[employer] password.  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  Patient is a current day smoker 2-3 packs per day.   He mentions his symptoms have been gradually worsening over the last year.  Denies any trauma or injury.   Patient endorses neck/interscapular pain with intermittent numbness/tingling of UE's.  Pain intensity 7/10.  He endorses posterior headaches which wax and wane.  Denies any saddle anesthesia, urinary or bowel incontinence.\par He ambulates with assistance of a cane.  He endorses difficulty with balance.  No falls.  Patient states he has tried physical therapy which exacerbates his pain.  He is under the care of Dr. Mclean (pain management).  He has not responded to pain management treatments.  He presents for consideration of surgical options.  \par  password at any time. 7. Enter your Password Reset Question and Answer. This can be used at a later time if you forget your password. 8. Enter your e-mail address. You will receive e-mail notification when new information is available in [address] 9. Click Sign Up. You can now view and download portions of your medical record. 10. Click the Download Summary menu link to download a portable copy of your medical information. If you have questions, please visit the Frequently Asked Questions section of the [employer] website. Remember, [employer] is NOT to be used for urgent needs. For medical emergencies, dial 911. Now available from your iPhone and Android! Please provide this summary of care documentation to your next provider. Your primary care clinician is listed as Andrea Donato. If you have any questions after today's visit, please call [phone].

## 2022-03-19 PROBLEM — R00.2 PALPITATIONS: Status: ACTIVE | Noted: 2017-04-06

## 2022-03-20 PROBLEM — R07.2 PRECORDIAL PAIN: Status: ACTIVE | Noted: 2018-02-05

## 2022-03-20 PROBLEM — R05.9 COUGH: Status: ACTIVE | Noted: 2018-02-05

## 2022-06-27 ENCOUNTER — TRANSCRIBE ORDER (OUTPATIENT)
Dept: SCHEDULING | Age: 57
End: 2022-06-27

## 2022-06-27 DIAGNOSIS — I35.9 AORTIC VALVE DISEASE: ICD-10-CM

## 2022-06-27 DIAGNOSIS — I34.1 MITRAL VALVE PROLAPSE: Primary | ICD-10-CM

## 2022-06-30 ENCOUNTER — TRANSCRIBE ORDER (OUTPATIENT)
Dept: SCHEDULING | Age: 57
End: 2022-06-30

## 2022-06-30 DIAGNOSIS — I35.1 AORTIC INCOMPETENCE: Primary | ICD-10-CM

## 2022-06-30 DIAGNOSIS — I34.1 MITRAL VALVE PROLAPSE: ICD-10-CM

## 2022-07-12 ENCOUNTER — HOSPITAL ENCOUNTER (OUTPATIENT)
Dept: NON INVASIVE DIAGNOSTICS | Age: 57
Discharge: HOME | End: 2022-07-12
Attending: INTERNAL MEDICINE
Payer: COMMERCIAL

## 2022-07-12 VITALS
HEIGHT: 69 IN | DIASTOLIC BLOOD PRESSURE: 78 MMHG | BODY MASS INDEX: 41.03 KG/M2 | WEIGHT: 277 LBS | SYSTOLIC BLOOD PRESSURE: 161 MMHG

## 2022-07-12 DIAGNOSIS — I35.1 AORTIC INCOMPETENCE: ICD-10-CM

## 2022-07-12 DIAGNOSIS — I34.1 MITRAL VALVE PROLAPSE: ICD-10-CM

## 2022-07-12 LAB
ECHO AO ASC DIAM: 3.1 CM
ECHO AO ASCENDING AORTA INDEX: 1.31 CM/M2
ECHO AO ROOT DIAM: 3.3 CM
ECHO AO ROOT INDEX: 1.39 CM/M2
ECHO AR MAX VEL PISA: 4.7 M/S
ECHO AV AREA PEAK VELOCITY: 1.3 CM2
ECHO AV AREA VTI: 1.5 CM2
ECHO AV AREA/BSA PEAK VELOCITY: 0.5 CM2/M2
ECHO AV AREA/BSA VTI: 0.6 CM2/M2
ECHO AV MEAN GRADIENT: 36 MMHG
ECHO AV MEAN VELOCITY: 2.9 M/S
ECHO AV PEAK GRADIENT: 61 MMHG
ECHO AV PEAK VELOCITY: 3.9 M/S
ECHO AV REGURGITANT PHT: 1354.2 MILLISECOND
ECHO AV VELOCITY RATIO: 0.28
ECHO AV VTI: 99.3 CM
ECHO EST RA PRESSURE: 3 MMHG
ECHO LA DIAMETER INDEX: 1.94 CM/M2
ECHO LA DIAMETER: 4.6 CM
ECHO LA TO AORTIC ROOT RATIO: 1.39
ECHO LA VOL 2C: 87 ML (ref 22–52)
ECHO LA VOL 4C: 101 ML (ref 22–52)
ECHO LA VOL BP: 95 ML (ref 22–52)
ECHO LA VOL/BSA BIPLANE: 40 ML/M2 (ref 16–34)
ECHO LA VOLUME AREA LENGTH: 103 ML
ECHO LA VOLUME INDEX A2C: 37 ML/M2 (ref 16–34)
ECHO LA VOLUME INDEX A4C: 43 ML/M2 (ref 16–34)
ECHO LA VOLUME INDEX AREA LENGTH: 43 ML/M2 (ref 16–34)
ECHO LV E' LATERAL VELOCITY: 9 CM/S
ECHO LV E' SEPTAL VELOCITY: 8 CM/S
ECHO LV EDV A2C: 108 ML
ECHO LV EDV A4C: 121 ML
ECHO LV EDV BP: 122 ML (ref 56–104)
ECHO LV EDV INDEX A4C: 51 ML/M2
ECHO LV EDV INDEX BP: 51 ML/M2
ECHO LV EDV NDEX A2C: 46 ML/M2
ECHO LV EJECTION FRACTION A2C: 63 %
ECHO LV EJECTION FRACTION A4C: 66 %
ECHO LV EJECTION FRACTION BIPLANE: 66 % (ref 55–100)
ECHO LV ESV A2C: 40 ML
ECHO LV ESV A4C: 41 ML
ECHO LV ESV BP: 41 ML (ref 19–49)
ECHO LV ESV INDEX A2C: 17 ML/M2
ECHO LV ESV INDEX A4C: 17 ML/M2
ECHO LV ESV INDEX BP: 17 ML/M2
ECHO LV FRACTIONAL SHORTENING: 36 % (ref 28–44)
ECHO LV INTERNAL DIMENSION DIASTOLE INDEX: 1.86 CM/M2
ECHO LV INTERNAL DIMENSION DIASTOLIC: 4.4 CM (ref 3.9–5.3)
ECHO LV INTERNAL DIMENSION SYSTOLIC INDEX: 1.18 CM/M2
ECHO LV INTERNAL DIMENSION SYSTOLIC: 2.8 CM
ECHO LV IVSD: 1.3 CM (ref 0.6–0.9)
ECHO LV MASS 2D: 203 G (ref 67–162)
ECHO LV MASS INDEX 2D: 85.7 G/M2 (ref 43–95)
ECHO LV POSTERIOR WALL DIASTOLIC: 1.2 CM (ref 0.6–0.9)
ECHO LV RELATIVE WALL THICKNESS RATIO: 0.55
ECHO LVOT AREA: 4.5 CM2
ECHO LVOT AV VTI INDEX: 0.33
ECHO LVOT DIAM: 2.4 CM
ECHO LVOT MEAN GRADIENT: 3 MMHG
ECHO LVOT PEAK GRADIENT: 5 MMHG
ECHO LVOT PEAK VELOCITY: 1.1 M/S
ECHO LVOT STROKE VOLUME INDEX: 61.6 ML/M2
ECHO LVOT SV: 146 ML
ECHO LVOT VTI: 32.3 CM
ECHO MAIN PULMONARY ARTERY DIAMETER: 1.8 CM
ECHO MV A VELOCITY: 1.17 M/S
ECHO MV AREA PHT: 4 CM2
ECHO MV AREA VTI: 4.5 CM2
ECHO MV E DECELERATION TIME (DT): 301.2 MS
ECHO MV E VELOCITY: 1.08 M/S
ECHO MV E/A RATIO: 0.92
ECHO MV E/E' LATERAL: 12
ECHO MV E/E' RATIO (AVERAGED): 12.75
ECHO MV E/E' SEPTAL: 13.5
ECHO MV LVOT VTI INDEX: 1
ECHO MV MAX VELOCITY: 1.2 M/S
ECHO MV MEAN GRADIENT: 2 MMHG
ECHO MV MEAN VELOCITY: 0.6 M/S
ECHO MV PEAK GRADIENT: 6 MMHG
ECHO MV PRESSURE HALF TIME (PHT): 54.8 MS
ECHO MV VTI: 32.4 CM
ECHO PV MAX VELOCITY: 1.1 M/S
ECHO PV PEAK GRADIENT: 5 MMHG
ECHO RIGHT VENTRICULAR SYSTOLIC PRESSURE (RVSP): 27 MMHG
ECHO RV TAPSE: 2.5 CM (ref 1.7–?)
ECHO TV REGURGITANT MAX VELOCITY: 2.47 M/S
ECHO TV REGURGITANT PEAK GRADIENT: 24 MMHG

## 2022-07-12 PROCEDURE — 93306 TTE W/DOPPLER COMPLETE: CPT

## 2023-10-31 ENCOUNTER — HOSPITAL ENCOUNTER (OUTPATIENT)
Age: 58
Discharge: HOME | End: 2023-11-02
Attending: INTERNAL MEDICINE
Payer: COMMERCIAL

## 2023-10-31 VITALS
BODY MASS INDEX: 41.03 KG/M2 | DIASTOLIC BLOOD PRESSURE: 64 MMHG | SYSTOLIC BLOOD PRESSURE: 133 MMHG | HEIGHT: 69 IN | WEIGHT: 277 LBS

## 2023-10-31 DIAGNOSIS — I35.9 AORTIC VALVE DISEASE: ICD-10-CM

## 2023-10-31 LAB
ECHO AO ASC DIAM: 3.1 CM
ECHO AO ASCENDING AORTA INDEX: 1.31 CM/M2
ECHO AO ROOT DIAM: 3.3 CM
ECHO AO ROOT INDEX: 1.39 CM/M2
ECHO AR MAX VEL PISA: 4.8 M/S
ECHO AV AREA PEAK VELOCITY: 1.3 CM2
ECHO AV AREA VTI: 1.2 CM2
ECHO AV AREA/BSA PEAK VELOCITY: 0.5 CM2/M2
ECHO AV AREA/BSA VTI: 0.5 CM2/M2
ECHO AV MEAN GRADIENT: 46 MMHG
ECHO AV MEAN VELOCITY: 3.2 M/S
ECHO AV PEAK GRADIENT: 76 MMHG
ECHO AV PEAK VELOCITY: 4.4 M/S
ECHO AV REGURGITANT PHT: 517.5 MILLISECOND
ECHO AV VELOCITY RATIO: 0.25
ECHO AV VTI: 126 CM
ECHO BSA: 2.47 M2
ECHO EST RA PRESSURE: 3 MMHG
ECHO LA DIAMETER INDEX: 1.94 CM/M2
ECHO LA DIAMETER: 4.6 CM
ECHO LA TO AORTIC ROOT RATIO: 1.39
ECHO LA VOL 2C: 103 ML (ref 22–52)
ECHO LA VOL 4C: 107 ML (ref 22–52)
ECHO LA VOL BP: 106 ML (ref 22–52)
ECHO LA VOL/BSA BIPLANE: 45 ML/M2 (ref 16–34)
ECHO LA VOLUME AREA LENGTH: 115 ML
ECHO LA VOLUME INDEX A2C: 43 ML/M2 (ref 16–34)
ECHO LA VOLUME INDEX A4C: 45 ML/M2 (ref 16–34)
ECHO LA VOLUME INDEX AREA LENGTH: 49 ML/M2 (ref 16–34)
ECHO LV E' LATERAL VELOCITY: 8 CM/S
ECHO LV E' SEPTAL VELOCITY: 10 CM/S
ECHO LV FRACTIONAL SHORTENING: 39 % (ref 28–44)
ECHO LV INTERNAL DIMENSION DIASTOLE INDEX: 2.07 CM/M2
ECHO LV INTERNAL DIMENSION DIASTOLIC: 4.9 CM (ref 3.9–5.3)
ECHO LV INTERNAL DIMENSION SYSTOLIC INDEX: 1.27 CM/M2
ECHO LV INTERNAL DIMENSION SYSTOLIC: 3 CM
ECHO LV IVSD: 1.2 CM (ref 0.6–0.9)
ECHO LV MASS 2D: 226.4 G (ref 67–162)
ECHO LV MASS INDEX 2D: 95.5 G/M2 (ref 43–95)
ECHO LV POSTERIOR WALL DIASTOLIC: 1.2 CM (ref 0.6–0.9)
ECHO LV RELATIVE WALL THICKNESS RATIO: 0.49
ECHO LVOT AREA: 4.9 CM2
ECHO LVOT AV VTI INDEX: 0.24
ECHO LVOT DIAM: 2.5 CM
ECHO LVOT MEAN GRADIENT: 3 MMHG
ECHO LVOT PEAK GRADIENT: 5 MMHG
ECHO LVOT PEAK VELOCITY: 1.1 M/S
ECHO LVOT STROKE VOLUME INDEX: 62.3 ML/M2
ECHO LVOT SV: 147.7 ML
ECHO LVOT VTI: 30.1 CM
ECHO MAIN PULMONARY ARTERY DIAMETER: 2.4 CM
ECHO MV A VELOCITY: 0.88 M/S
ECHO MV AREA VTI: 4.3 CM2
ECHO MV E DECELERATION TIME (DT): 163.7 MS
ECHO MV E VELOCITY: 1.4 M/S
ECHO MV E/A RATIO: 1.59
ECHO MV E/E' LATERAL: 17.5
ECHO MV E/E' RATIO (AVERAGED): 15.75
ECHO MV E/E' SEPTAL: 14
ECHO MV LVOT VTI INDEX: 1.15
ECHO MV MAX VELOCITY: 1.4 M/S
ECHO MV MEAN GRADIENT: 2 MMHG
ECHO MV MEAN VELOCITY: 0.6 M/S
ECHO MV PEAK GRADIENT: 8 MMHG
ECHO MV VTI: 34.7 CM
ECHO PULMONARY ARTERY END DIASTOLIC PRESSURE: 6 MMHG
ECHO PV MAX VELOCITY: 1 M/S
ECHO PV MEAN GRADIENT: 2 MMHG
ECHO PV MEAN VELOCITY: 0.7 M/S
ECHO PV PEAK GRADIENT: 4 MMHG
ECHO PV REGURGITANT MAX VELOCITY: 1.2 M/S
ECHO RA END SYSTOLIC VOLUME APICAL 4 CHAMBER INDEX BSA: 32 ML/M2
ECHO RA VOLUME BIPLANE METHOD OF DISKS: 67 ML
ECHO RA VOLUME INDEX BP: 28 ML/M2
ECHO RA VOLUME: 75 ML
ECHO RIGHT VENTRICULAR SYSTOLIC PRESSURE (RVSP): 40 MMHG
ECHO RV TAPSE: 2.9 CM (ref 1.7–?)
ECHO TV REGURGITANT MAX VELOCITY: 3.04 M/S
ECHO TV REGURGITANT PEAK GRADIENT: 37 MMHG

## 2023-10-31 PROCEDURE — 93306 TTE W/DOPPLER COMPLETE: CPT

## 2023-11-30 ENCOUNTER — OFFICE VISIT (OUTPATIENT)
Age: 58
End: 2023-11-30

## 2023-11-30 VITALS
OXYGEN SATURATION: 97 % | DIASTOLIC BLOOD PRESSURE: 80 MMHG | HEIGHT: 69 IN | WEIGHT: 259 LBS | HEART RATE: 49 BPM | BODY MASS INDEX: 38.36 KG/M2 | SYSTOLIC BLOOD PRESSURE: 136 MMHG

## 2023-11-30 DIAGNOSIS — R00.2 PALPITATIONS: ICD-10-CM

## 2023-11-30 DIAGNOSIS — E66.01 SEVERE OBESITY WITH BODY MASS INDEX (BMI) OF 35.0 TO 39.9 WITH SERIOUS COMORBIDITY (HCC): ICD-10-CM

## 2023-11-30 DIAGNOSIS — I10 ESSENTIAL HYPERTENSION, BENIGN: ICD-10-CM

## 2023-11-30 DIAGNOSIS — I35.0 SEVERE AORTIC STENOSIS: Primary | ICD-10-CM

## 2023-11-30 PROBLEM — F32.A DEPRESSION: Status: ACTIVE | Noted: 2023-11-30

## 2023-11-30 PROBLEM — I34.1 MITRAL VALVE PROLAPSE: Status: ACTIVE | Noted: 2023-11-30

## 2023-11-30 RX ORDER — LORAZEPAM 0.5 MG/1
0.5 TABLET ORAL 3 TIMES DAILY PRN
COMMUNITY
Start: 2023-10-16

## 2023-11-30 RX ORDER — ESCITALOPRAM OXALATE 10 MG/1
10 TABLET ORAL DAILY
COMMUNITY
Start: 2023-11-11

## 2023-11-30 RX ORDER — PROPRANOLOL HCL 60 MG
60 CAPSULE, EXTENDED RELEASE 24HR ORAL DAILY
COMMUNITY
Start: 2023-11-11

## 2023-11-30 RX ORDER — AMLODIPINE BESYLATE 5 MG/1
5 TABLET ORAL DAILY
COMMUNITY
Start: 2023-11-20

## 2023-11-30 RX ORDER — LOSARTAN POTASSIUM 25 MG/1
25 TABLET ORAL DAILY
COMMUNITY
Start: 2023-11-11

## 2023-11-30 RX ORDER — HYDROCHLOROTHIAZIDE 12.5 MG/1
12.5 CAPSULE, GELATIN COATED ORAL DAILY
COMMUNITY
Start: 2023-11-11

## 2023-11-30 ASSESSMENT — ANXIETY QUESTIONNAIRES
IF YOU CHECKED OFF ANY PROBLEMS ON THIS QUESTIONNAIRE, HOW DIFFICULT HAVE THESE PROBLEMS MADE IT FOR YOU TO DO YOUR WORK, TAKE CARE OF THINGS AT HOME, OR GET ALONG WITH OTHER PEOPLE: NOT DIFFICULT AT ALL
1. FEELING NERVOUS, ANXIOUS, OR ON EDGE: 0
4. TROUBLE RELAXING: 0
5. BEING SO RESTLESS THAT IT IS HARD TO SIT STILL: 0
7. FEELING AFRAID AS IF SOMETHING AWFUL MIGHT HAPPEN: 0
3. WORRYING TOO MUCH ABOUT DIFFERENT THINGS: 0
2. NOT BEING ABLE TO STOP OR CONTROL WORRYING: 0
GAD7 TOTAL SCORE: 0
6. BECOMING EASILY ANNOYED OR IRRITABLE: 0

## 2023-11-30 ASSESSMENT — ENCOUNTER SYMPTOMS
ABDOMINAL PAIN: 0
SHORTNESS OF BREATH: 0
ABDOMINAL DISTENTION: 0
SORE THROAT: 0
VOMITING: 0
NAUSEA: 0
COUGH: 0

## 2023-11-30 ASSESSMENT — PATIENT HEALTH QUESTIONNAIRE - PHQ9
SUM OF ALL RESPONSES TO PHQ QUESTIONS 1-9: 0
1. LITTLE INTEREST OR PLEASURE IN DOING THINGS: 0
SUM OF ALL RESPONSES TO PHQ9 QUESTIONS 1 & 2: 0
2. FEELING DOWN, DEPRESSED OR HOPELESS: 0

## 2023-11-30 NOTE — PROGRESS NOTES
Farshad Davis presents today for   Chief Complaint   Patient presents with    New Patient     Last seen Dr. Nick Rutherford on 08-10-18, Referred back by PCP for Symptomatic severe aortic stenosis with low ejection fraction       Farshad Davis preferred language for health care discussion is english/other. Is someone accompanying this pt? yes    Is the patient using any DME equipment during OV? no    Depression Screening:  Depression: Not at risk (11/30/2023)    PHQ-2     PHQ-2 Score: 0        Learning Assessment:  Who is the primary learner? Patient    What is the preferred language for health care of the primary learner? ENGLISH    How does the primary learner prefer to learn new concepts? DEMONSTRATION    Answered By patient    Relationship to Learner SELF           Pt currently taking Anticoagulant therapy? no    Pt currently taking Antiplatelet therapy ? no      Coordination of Care:  1. Have you been to the ER, urgent care clinic since your last visit? Hospitalized since your last visit? no    2. Have you seen or consulted any other health care providers outside of the [address] [place] [address] since your last visit? Include any pap smears or colon screening.  no

## 2024-02-19 ENCOUNTER — PATIENT MESSAGE (OUTPATIENT)
Age: 59
End: 2024-02-19

## 2024-03-15 ENCOUNTER — OFFICE VISIT (OUTPATIENT)
Age: 59
End: 2024-03-15
Payer: COMMERCIAL

## 2024-03-15 VITALS
OXYGEN SATURATION: 99 % | BODY MASS INDEX: 39.99 KG/M2 | HEIGHT: 69 IN | WEIGHT: 270 LBS | HEART RATE: 61 BPM | SYSTOLIC BLOOD PRESSURE: 138 MMHG | DIASTOLIC BLOOD PRESSURE: 88 MMHG

## 2024-03-15 DIAGNOSIS — Q21.12 PATENT FORAMEN OVALE: ICD-10-CM

## 2024-03-15 DIAGNOSIS — R00.2 PALPITATIONS: ICD-10-CM

## 2024-03-15 DIAGNOSIS — E66.01 SEVERE OBESITY WITH BODY MASS INDEX (BMI) OF 35.0 TO 39.9 WITH SERIOUS COMORBIDITY (HCC): ICD-10-CM

## 2024-03-15 DIAGNOSIS — I35.0 SEVERE AORTIC STENOSIS: Primary | ICD-10-CM

## 2024-03-15 DIAGNOSIS — I10 ESSENTIAL HYPERTENSION, BENIGN: ICD-10-CM

## 2024-03-15 PROCEDURE — 93000 ELECTROCARDIOGRAM COMPLETE: CPT | Performed by: INTERNAL MEDICINE

## 2024-03-15 PROCEDURE — 3075F SYST BP GE 130 - 139MM HG: CPT | Performed by: INTERNAL MEDICINE

## 2024-03-15 PROCEDURE — 3079F DIAST BP 80-89 MM HG: CPT | Performed by: INTERNAL MEDICINE

## 2024-03-15 PROCEDURE — 99214 OFFICE O/P EST MOD 30 MIN: CPT | Performed by: INTERNAL MEDICINE

## 2024-03-15 ASSESSMENT — ENCOUNTER SYMPTOMS
SORE THROAT: 0
NAUSEA: 0
VOMITING: 0
ABDOMINAL PAIN: 0
ABDOMINAL DISTENTION: 0
COUGH: 0
SHORTNESS OF BREATH: 0

## 2024-03-15 ASSESSMENT — ANXIETY QUESTIONNAIRES
4. TROUBLE RELAXING: 0
6. BECOMING EASILY ANNOYED OR IRRITABLE: 0
2. NOT BEING ABLE TO STOP OR CONTROL WORRYING: 0
5. BEING SO RESTLESS THAT IT IS HARD TO SIT STILL: 0
7. FEELING AFRAID AS IF SOMETHING AWFUL MIGHT HAPPEN: 0
3. WORRYING TOO MUCH ABOUT DIFFERENT THINGS: 0
GAD7 TOTAL SCORE: 0

## 2024-03-15 ASSESSMENT — PATIENT HEALTH QUESTIONNAIRE - PHQ9
3. TROUBLE FALLING OR STAYING ASLEEP: 0
SUM OF ALL RESPONSES TO PHQ QUESTIONS 1-9: 0
4. FEELING TIRED OR HAVING LITTLE ENERGY: 0
6. FEELING BAD ABOUT YOURSELF - OR THAT YOU ARE A FAILURE OR HAVE LET YOURSELF OR YOUR FAMILY DOWN: 0
10. IF YOU CHECKED OFF ANY PROBLEMS, HOW DIFFICULT HAVE THESE PROBLEMS MADE IT FOR YOU TO DO YOUR WORK, TAKE CARE OF THINGS AT HOME, OR GET ALONG WITH OTHER PEOPLE: 0
SUM OF ALL RESPONSES TO PHQ QUESTIONS 1-9: 0
7. TROUBLE CONCENTRATING ON THINGS, SUCH AS READING THE NEWSPAPER OR WATCHING TELEVISION: 0
9. THOUGHTS THAT YOU WOULD BE BETTER OFF DEAD, OR OF HURTING YOURSELF: 0
8. MOVING OR SPEAKING SO SLOWLY THAT OTHER PEOPLE COULD HAVE NOTICED. OR THE OPPOSITE, BEING SO FIGETY OR RESTLESS THAT YOU HAVE BEEN MOVING AROUND A LOT MORE THAN USUAL: 0
1. LITTLE INTEREST OR PLEASURE IN DOING THINGS: 0
SUM OF ALL RESPONSES TO PHQ QUESTIONS 1-9: 0
5. POOR APPETITE OR OVEREATING: 0
SUM OF ALL RESPONSES TO PHQ QUESTIONS 1-9: 0
2. FEELING DOWN, DEPRESSED OR HOPELESS: 0
SUM OF ALL RESPONSES TO PHQ9 QUESTIONS 1 & 2: 0

## 2024-03-15 NOTE — PROGRESS NOTES
03/15/24     Soni Botello  is a 58 y.o. female     Chief Complaint   Patient presents with    Follow-up     3 month       HPI    Patient presents for a follow-up office visit.  She was initially referred here by her PCP for evaluation of worsening aortic valve stenosis.  Patient reports having a cardiac murmur since the age of 17 which was first noticed when she was pregnant.  She also has history of hypertension, obesity, and anxiety.    She underwent a recent follow-up echocardiogram which was ordered by her PCP at the end of October 2023 which showed the aortic stenosis now in the severe range with a peak velocity of 4.4 m/s, mean gradient of 46 mmHg, calculated KARRI at 1.2 cm².  Probable aortic regurgitation.  Valve appeared functionally bicuspid.  Normal LVEF of 55 to 60% with mild concentric LVH.  There was a left-to-right shunt on color flow Doppler either representing a large PFO or secundum ASD.  Her mitral valve annulus was also significantly calcified.  Aortic root was normal in size.    Patient was last seen in our office 3 to 4 months ago.  She returns today with 2 of her children to discuss further options.  She states she has gained another 10 pounds in weight since last visit.  She denies any major change in her activity tolerance.  No exertional chest pain, exertional dyspnea or exertional dizziness.  She does complain of intermittent heart palpitations which feel like her heart is skipping a beat now and again.  This is no worse than it was when she was taking her propranolol.    Past Medical History:   Diagnosis Date    Aortic valve disorders     Cough 2/5/2018 2/18 poss ace i induced. change to losartan    Degenerative joint disease     Essential hypertension, benign     CONTROLLED     Lumbar herniated disc     Mitral valve disorders(424.0)     8/09 MILD MR     Obesity, unspecified     Transfusion history     PATIENT HAS A HX OF RECEIVING BLOOD OR BLOOD PRODUCT TRANSFUSION(S)     Current

## 2024-03-15 NOTE — PROGRESS NOTES
Soni oBtello presents today for   Chief Complaint   Patient presents with    Follow-up     3 month       Soni Botello preferred language for health care discussion is english/other.    Is someone accompanying this pt? no    Is the patient using any DME equipment during OV? no    Depression Screening:  Depression: Not at risk (3/15/2024)    PHQ-2     PHQ-2 Score: 0        Learning Assessment:  Who is the primary learner? Patient    What is the preferred language for health care of the primary learner? ENGLISH    How does the primary learner prefer to learn new concepts? DEMONSTRATION    Answered By patient    Relationship to Learner SELF           Pt currently taking Anticoagulant therapy? no    Pt currently taking Antiplatelet therapy ? no      Coordination of Care:  1. Have you been to the ER, urgent care clinic since your last visit? Hospitalized since your last visit? no    2. Have you seen or consulted any other health care providers outside of the [facility] since your last visit? Include any pap smears or colon screening. no

## 2024-07-28 ENCOUNTER — APPOINTMENT (OUTPATIENT)
Age: 59
DRG: 309 | End: 2024-07-28
Payer: COMMERCIAL

## 2024-07-28 ENCOUNTER — HOSPITAL ENCOUNTER (INPATIENT)
Age: 59
LOS: 1 days | Discharge: HOME | DRG: 309 | End: 2024-07-29
Attending: EMERGENCY MEDICINE | Admitting: INTERNAL MEDICINE
Payer: COMMERCIAL

## 2024-07-28 DIAGNOSIS — I48.91 ATRIAL FIBRILLATION, UNSPECIFIED TYPE (HCC): Primary | ICD-10-CM

## 2024-07-28 LAB
ALBUMIN SERPL-MCNC: 3.7 G/DL (ref 3.4–5)
ALBUMIN/GLOB SERPL: 1.1 (ref 0.8–1.7)
ALP SERPL-CCNC: 89 U/L (ref 45–117)
ALT SERPL-CCNC: 27 U/L (ref 13–56)
ANION GAP SERPL CALC-SCNC: 9 MMOL/L (ref 3–18)
AST SERPL W P-5'-P-CCNC: 19 U/L (ref 10–38)
BASOPHILS # BLD: 0.1 K/UL (ref 0–0.1)
BASOPHILS NFR BLD: 1 % (ref 0–2)
BILIRUB SERPL-MCNC: 0.8 MG/DL (ref 0.2–1)
BNP SERPL-MCNC: 305 PG/ML (ref 0–900)
BUN SERPL-MCNC: 9 MG/DL (ref 7–18)
BUN/CREAT SERPL: 13 (ref 12–20)
CA-I BLD-MCNC: 9.6 MG/DL (ref 8.5–10.1)
CHLORIDE SERPL-SCNC: 108 MMOL/L (ref 100–111)
CO2 SERPL-SCNC: 24 MMOL/L (ref 21–32)
CREAT SERPL-MCNC: 0.71 MG/DL (ref 0.6–1.3)
D DIMER PPP FEU-MCNC: 0.49 UG/ML(FEU)
DIFFERENTIAL METHOD BLD: NORMAL
EOSINOPHIL # BLD: 0.2 K/UL (ref 0–0.4)
EOSINOPHIL NFR BLD: 2 % (ref 0–5)
ERYTHROCYTE [DISTWIDTH] IN BLOOD BY AUTOMATED COUNT: 13 % (ref 11.6–14.5)
GLOBULIN SER CALC-MCNC: 3.4 G/DL (ref 2–4)
GLUCOSE SERPL-MCNC: 120 MG/DL (ref 74–99)
HCT VFR BLD AUTO: 42.3 % (ref 35–45)
HGB BLD-MCNC: 14.7 G/DL (ref 12–16)
IMM GRANULOCYTES # BLD AUTO: 0 K/UL (ref 0–0.04)
IMM GRANULOCYTES NFR BLD AUTO: 0 % (ref 0–0.5)
LYMPHOCYTES # BLD: 2.7 K/UL (ref 0.9–3.6)
LYMPHOCYTES NFR BLD: 36 % (ref 21–52)
MAGNESIUM SERPL-MCNC: 2 MG/DL (ref 1.6–2.6)
MCH RBC QN AUTO: 27.9 PG (ref 24–34)
MCHC RBC AUTO-ENTMCNC: 34.8 G/DL (ref 31–37)
MCV RBC AUTO: 80.3 FL (ref 78–100)
MONOCYTES # BLD: 0.5 K/UL (ref 0.05–1.2)
MONOCYTES NFR BLD: 7 % (ref 3–10)
NEUTS SEG # BLD: 4.1 K/UL (ref 1.8–8)
NEUTS SEG NFR BLD: 54 % (ref 40–73)
NRBC # BLD: 0 K/UL (ref 0–0.01)
NRBC BLD-RTO: 0 PER 100 WBC
PLATELET # BLD AUTO: 264 K/UL (ref 135–420)
PMV BLD AUTO: 10 FL (ref 9.2–11.8)
POTASSIUM SERPL-SCNC: 3.3 MMOL/L (ref 3.5–5.5)
PROT SERPL-MCNC: 7.1 G/DL (ref 6.4–8.2)
RBC # BLD AUTO: 5.27 M/UL (ref 4.2–5.3)
SODIUM SERPL-SCNC: 141 MMOL/L (ref 136–145)
TROPONIN I SERPL HS-MCNC: 121 NG/L (ref 0–54)
TROPONIN I SERPL HS-MCNC: 34 NG/L (ref 0–54)
TSH SERPL DL<=0.05 MIU/L-ACNC: 2.35 UIU/ML (ref 0.36–3.74)
WBC # BLD AUTO: 7.5 K/UL (ref 4.6–13.2)

## 2024-07-28 PROCEDURE — 93005 ELECTROCARDIOGRAM TRACING: CPT | Performed by: EMERGENCY MEDICINE

## 2024-07-28 PROCEDURE — 6360000002 HC RX W HCPCS: Performed by: NURSE PRACTITIONER

## 2024-07-28 PROCEDURE — 96374 THER/PROPH/DIAG INJ IV PUSH: CPT

## 2024-07-28 PROCEDURE — 85025 COMPLETE CBC W/AUTO DIFF WBC: CPT

## 2024-07-28 PROCEDURE — 99285 EMERGENCY DEPT VISIT HI MDM: CPT

## 2024-07-28 PROCEDURE — 96376 TX/PRO/DX INJ SAME DRUG ADON: CPT

## 2024-07-28 PROCEDURE — 2580000003 HC RX 258: Performed by: NURSE PRACTITIONER

## 2024-07-28 PROCEDURE — 2000000000 HC ICU R&B

## 2024-07-28 PROCEDURE — 71045 X-RAY EXAM CHEST 1 VIEW: CPT

## 2024-07-28 PROCEDURE — 2500000003 HC RX 250 WO HCPCS: Performed by: EMERGENCY MEDICINE

## 2024-07-28 PROCEDURE — 84443 ASSAY THYROID STIM HORMONE: CPT

## 2024-07-28 PROCEDURE — 83735 ASSAY OF MAGNESIUM: CPT

## 2024-07-28 PROCEDURE — 83880 ASSAY OF NATRIURETIC PEPTIDE: CPT

## 2024-07-28 PROCEDURE — 6370000000 HC RX 637 (ALT 250 FOR IP): Performed by: NURSE PRACTITIONER

## 2024-07-28 PROCEDURE — 85379 FIBRIN DEGRADATION QUANT: CPT

## 2024-07-28 PROCEDURE — 80053 COMPREHEN METABOLIC PANEL: CPT

## 2024-07-28 PROCEDURE — 84484 ASSAY OF TROPONIN QUANT: CPT

## 2024-07-28 RX ORDER — DILTIAZEM HYDROCHLORIDE 5 MG/ML
20 INJECTION INTRAVENOUS ONCE
Status: COMPLETED | OUTPATIENT
Start: 2024-07-28 | End: 2024-07-28

## 2024-07-28 RX ORDER — POTASSIUM CHLORIDE 750 MG/1
40 TABLET, EXTENDED RELEASE ORAL ONCE
Status: COMPLETED | OUTPATIENT
Start: 2024-07-28 | End: 2024-07-28

## 2024-07-28 RX ORDER — POLYETHYLENE GLYCOL 3350 17 G/17G
17 POWDER, FOR SOLUTION ORAL DAILY PRN
Status: DISCONTINUED | OUTPATIENT
Start: 2024-07-28 | End: 2024-07-29 | Stop reason: HOSPADM

## 2024-07-28 RX ORDER — DILTIAZEM HYDROCHLORIDE 5 MG/ML
10 INJECTION INTRAVENOUS ONCE
Status: COMPLETED | OUTPATIENT
Start: 2024-07-28 | End: 2024-07-28

## 2024-07-28 RX ORDER — DILTIAZEM HYDROCHLORIDE 5 MG/ML
20 INJECTION INTRAVENOUS ONCE
Status: DISCONTINUED | OUTPATIENT
Start: 2024-07-28 | End: 2024-07-29 | Stop reason: HOSPADM

## 2024-07-28 RX ORDER — ACETAMINOPHEN 325 MG/1
650 TABLET ORAL EVERY 6 HOURS PRN
Status: DISCONTINUED | OUTPATIENT
Start: 2024-07-28 | End: 2024-07-29 | Stop reason: HOSPADM

## 2024-07-28 RX ORDER — HYDROCHLOROTHIAZIDE 25 MG/1
12.5 TABLET ORAL DAILY
Status: DISCONTINUED | OUTPATIENT
Start: 2024-07-29 | End: 2024-07-29

## 2024-07-28 RX ORDER — SODIUM CHLORIDE 0.9 % (FLUSH) 0.9 %
5-40 SYRINGE (ML) INJECTION EVERY 12 HOURS SCHEDULED
Status: DISCONTINUED | OUTPATIENT
Start: 2024-07-28 | End: 2024-07-29 | Stop reason: HOSPADM

## 2024-07-28 RX ORDER — ACETAMINOPHEN 650 MG/1
650 SUPPOSITORY RECTAL EVERY 6 HOURS PRN
Status: DISCONTINUED | OUTPATIENT
Start: 2024-07-28 | End: 2024-07-29 | Stop reason: HOSPADM

## 2024-07-28 RX ORDER — ONDANSETRON 2 MG/ML
4 INJECTION INTRAMUSCULAR; INTRAVENOUS EVERY 6 HOURS PRN
Status: DISCONTINUED | OUTPATIENT
Start: 2024-07-28 | End: 2024-07-29 | Stop reason: HOSPADM

## 2024-07-28 RX ORDER — CHLORHEXIDINE GLUCONATE ORAL RINSE 1.2 MG/ML
15 SOLUTION DENTAL 2 TIMES DAILY
Status: ON HOLD | COMMUNITY
Start: 2024-06-27 | End: 2024-07-28 | Stop reason: CLARIF

## 2024-07-28 RX ORDER — LOSARTAN POTASSIUM 25 MG/1
25 TABLET ORAL DAILY
Status: DISCONTINUED | OUTPATIENT
Start: 2024-07-29 | End: 2024-07-29

## 2024-07-28 RX ORDER — ONDANSETRON 4 MG/1
4 TABLET, ORALLY DISINTEGRATING ORAL EVERY 8 HOURS PRN
Status: DISCONTINUED | OUTPATIENT
Start: 2024-07-28 | End: 2024-07-29 | Stop reason: HOSPADM

## 2024-07-28 RX ORDER — SODIUM CHLORIDE 0.9 % (FLUSH) 0.9 %
5-40 SYRINGE (ML) INJECTION PRN
Status: DISCONTINUED | OUTPATIENT
Start: 2024-07-28 | End: 2024-07-29 | Stop reason: HOSPADM

## 2024-07-28 RX ORDER — ENOXAPARIN SODIUM 150 MG/ML
1 INJECTION SUBCUTANEOUS 2 TIMES DAILY
Status: DISCONTINUED | OUTPATIENT
Start: 2024-07-28 | End: 2024-07-29

## 2024-07-28 RX ADMIN — Medication 5 MG/HR: at 16:29

## 2024-07-28 RX ADMIN — POTASSIUM CHLORIDE 40 MEQ: 750 TABLET, EXTENDED RELEASE ORAL at 19:44

## 2024-07-28 RX ADMIN — SODIUM CHLORIDE, PRESERVATIVE FREE 10 ML: 5 INJECTION INTRAVENOUS at 21:23

## 2024-07-28 RX ADMIN — ENOXAPARIN SODIUM 120 MG: 150 INJECTION SUBCUTANEOUS at 21:13

## 2024-07-28 RX ADMIN — DILTIAZEM HYDROCHLORIDE 10 MG: 5 INJECTION, SOLUTION INTRAVENOUS at 17:46

## 2024-07-28 RX ADMIN — ACETAMINOPHEN 650 MG: 325 TABLET ORAL at 22:52

## 2024-07-28 RX ADMIN — DILTIAZEM HYDROCHLORIDE 20 MG: 5 INJECTION, SOLUTION INTRAVENOUS at 16:21

## 2024-07-28 ASSESSMENT — PAIN SCALES - GENERAL
PAINLEVEL_OUTOF10: 4
PAINLEVEL_OUTOF10: 7

## 2024-07-28 ASSESSMENT — PAIN DESCRIPTION - ORIENTATION: ORIENTATION: ANTERIOR

## 2024-07-28 ASSESSMENT — PAIN - FUNCTIONAL ASSESSMENT: PAIN_FUNCTIONAL_ASSESSMENT: NONE - DENIES PAIN

## 2024-07-28 ASSESSMENT — LIFESTYLE VARIABLES
HOW MANY STANDARD DRINKS CONTAINING ALCOHOL DO YOU HAVE ON A TYPICAL DAY: PATIENT DOES NOT DRINK
HOW OFTEN DO YOU HAVE A DRINK CONTAINING ALCOHOL: NEVER

## 2024-07-28 ASSESSMENT — PAIN DESCRIPTION - DESCRIPTORS: DESCRIPTORS: ACHING;DULL

## 2024-07-28 ASSESSMENT — PAIN DESCRIPTION - LOCATION: LOCATION: HEAD

## 2024-07-28 NOTE — ED TRIAGE NOTES
Patient with palpations started at 1330, states they are not as bad as when they 1st started. Patient states she is suppose to have a aortic valve replacement but is going through a divorce. Patient seen by cardiologist In February. Patient states nausea, diaphoresis and hot when palpations started. Stopped anxiety meds in march per cardiologist

## 2024-07-28 NOTE — ED NOTES
TRANSFER - OUT REPORT:    Verbal report given to CHELSEA Tam on Soni Botello  being transferred to ICU 2 for routine progression of patient care       Report consisted of patient's Situation, Background, Assessment and   Recommendations(SBAR).     Information from the following report(s) Nurse Handoff Report, ED Encounter Summary, ED SBAR, MAR, and Recent Results was reviewed with the receiving nurse.    [place] Fall Assessment:    Presents to emergency department  because of falls (Syncope, seizure, or loss of consciousness): No  Age > 70: No  Altered Mental Status, Intoxication with alcohol or substance confusion (Disorientation, impaired judgment, poor safety awaremess, or inability to follow instructions): No  Impaired Mobility: Ambulates or transfers with assistive devices or assistance; Unable to ambulate or transer.: No             Lines:   Peripheral IV 07/28/24 Left Antecubital (Active)   Site Assessment Clean, dry & intact 07/28/24 1559   Phlebitis Assessment No symptoms 07/28/24 1559   Infiltration Assessment 0 07/28/24 1559       Peripheral IV 07/28/24 Right Antecubital (Active)   Site Assessment Clean, dry & intact 07/28/24 1600   Line Status Blood return noted 07/28/24 1600   Phlebitis Assessment No symptoms 07/28/24 1600   Infiltration Assessment 0 07/28/24 1600        Opportunity for questions and clarification was provided.      Patient transported with:  Monitor and Registered Nurse

## 2024-07-28 NOTE — H&P
History and Physical    Subjective:     Soni Botello is a 58 y.o.  female with a past medical history for mitral valve regurgitation, aortic stenosis,  hypertension, and  PFO seen on ECHO, patient presents to the ED with a chief complaint of palpitations.  Patient reports she started feeling bad after [employer] today, where she felt like her heart was going to jump out of her chest, and states the quivering, other accompanying symptoms included mild midsternal pressure, neck pain and diaphoresis.  Patient reports symptoms started about 115 and lasted until about 330 at that time is when she activated EMS and was brought to the ED.  Patient denies any fever, chills, left-sided chest pain, shortness of breath, nausea, vomiting, and diarrhea.  Patient endorses a longstanding history of mitral valve regurgitation and aortic stenosis, in which she is seeing cardiologist Dr. Brewster, where has been discussed that patient will need a mitral valve replacement, but was told she should lose weight prior to getting it done.  While in the ED labs and imaging was done and reviewed which shows a potassium of 3.3, a TSH of 2.35, D-dimer of 0.49, chest x-ray did not reveal any acute cardiopulmonary abnormalities, EKG did show atrial fibrillation with RVR with a rate of 141.  While in the ED patient was given a bolus of 10 mg Cardizem and was started on a Cardizem drip.  Patient was assessed at bedside while in the ED patient is alert and oriented x 3, there are no acute signs or symptoms of distress noted at this time.  Patient agrees to admission for new onset of atrial fibrillation, treatment to include continue Cardizem drip until rate is less than 100 or patient has converted to sinus rhythm, cardiology consultation, repeat echo, and cardiology monitoring.    Discussed case with ED provider, hospital medicine will admit the patient for further evaluation and treatment.    Admit to ICU.    Past Medical History:

## 2024-07-28 NOTE — ED PROVIDER NOTES
F 2 ICU  EMERGENCY DEPARTMENT ENCOUNTER       Pt Name: Soni Botello  MRN: [id number]  Birthdate 1965  Date of evaluation: 7/28/2024  Provider: Karina Galloway MD   PCP: Janice Crespo, MICHAEL - CNP  Note Started: 7:40 PM 7/28/24     CHIEF COMPLAINT       Chief Complaint   Patient presents with    Palpitations        HISTORY OF PRESENT ILLNESS: 1 or more elements      History From: Patient  History limited by: Nothing     Soni Botello is a 58 y.o. female who presents to the ED via EMS complaining of palpitations.  Patient reports that she started not feeling well after chest today.  She states that she felt like her heart skipping, she was at then standing in line at the grocery store when she felt her heart racing, felt like \"going to jump out of her chest,.  She became sweaty, also had slight pressure upper sternal area.  She reports going through divorce and is stressing her out.  She reports is followed by Dr. Brewster for aortic stenosis.  She has no headache, she denies nausea, vomiting, shortness of breath.     Nursing Notes were all reviewed and agreed with or any disagreements were addressed in the HPI.     REVIEW OF SYSTEMS      Review of Systems     Positives and Pertinent negatives as per HPI.    PAST HISTORY     Past Medical History:  Past Medical History:   Diagnosis Date    Aortic valve disorders     Cough 2/5/2018 2/18 poss ace i induced. change to losartan    Degenerative joint disease     Essential hypertension, benign     CONTROLLED     Lumbar herniated disc     Mitral valve disorders(424.0)     8/09 MILD MR     Obesity, unspecified     Transfusion history     PATIENT HAS A HX OF RECEIVING BLOOD OR BLOOD PRODUCT TRANSFUSION(S)       Past Surgical History:  Past Surgical History:   Procedure Laterality Date    CHOLECYSTECTOMY, LAPAROSCOPIC      HERNIA REPAIR      TUBAL LIGATION         Family History:  Family History   Problem Relation Age of Onset    Heart Surgery

## 2024-07-29 ENCOUNTER — APPOINTMENT (OUTPATIENT)
Age: 59
DRG: 309 | End: 2024-07-29
Payer: COMMERCIAL

## 2024-07-29 VITALS
RESPIRATION RATE: 13 BRPM | WEIGHT: 276 LBS | BODY MASS INDEX: 40.88 KG/M2 | DIASTOLIC BLOOD PRESSURE: 53 MMHG | HEIGHT: 69 IN | HEART RATE: 48 BPM | SYSTOLIC BLOOD PRESSURE: 118 MMHG | TEMPERATURE: 98 F | OXYGEN SATURATION: 97 %

## 2024-07-29 LAB
ANION GAP SERPL CALC-SCNC: 7 MMOL/L (ref 3–18)
BUN SERPL-MCNC: 11 MG/DL (ref 7–18)
BUN/CREAT SERPL: 19 (ref 12–20)
CA-I BLD-MCNC: 8.8 MG/DL (ref 8.5–10.1)
CHLORIDE SERPL-SCNC: 109 MMOL/L (ref 100–111)
CHOLEST SERPL-MCNC: 192 MG/DL
CO2 SERPL-SCNC: 24 MMOL/L (ref 21–32)
CREAT SERPL-MCNC: 0.58 MG/DL (ref 0.6–1.3)
ECHO AO ASC DIAM: 3.3 CM
ECHO AO ASCENDING AORTA INDEX: 1.39 CM/M2
ECHO AO ROOT DIAM: 3.4 CM
ECHO AO ROOT INDEX: 1.43 CM/M2
ECHO AR MAX VEL PISA: 3.7 M/S
ECHO AV AREA PEAK VELOCITY: 1.1 CM2
ECHO AV AREA VTI: 1.2 CM2
ECHO AV AREA/BSA PEAK VELOCITY: 0.5 CM2/M2
ECHO AV AREA/BSA VTI: 0.5 CM2/M2
ECHO AV MEAN GRADIENT: 46 MMHG
ECHO AV MEAN VELOCITY: 3.3 M/S
ECHO AV PEAK GRADIENT: 71 MMHG
ECHO AV PEAK VELOCITY: 4.2 M/S
ECHO AV REGURGITANT PHT: 714 MS
ECHO AV VELOCITY RATIO: 0.26
ECHO AV VTI: 112 CM
ECHO BSA: 2.47 M2
ECHO EST RA PRESSURE: 3 MMHG
ECHO IVC PROX: 2 CM
ECHO LA AREA 2C: 29.9 CM2
ECHO LA AREA 4C: 27.2 CM2
ECHO LA DIAMETER INDEX: 2.11 CM/M2
ECHO LA DIAMETER: 5 CM
ECHO LA MAJOR AXIS: 6.6 CM
ECHO LA MINOR AXIS: 6.2 CM
ECHO LA TO AORTIC ROOT RATIO: 1.47
ECHO LA VOL BP: 104 ML (ref 22–52)
ECHO LA VOL MOD A2C: 118 ML (ref 22–52)
ECHO LA VOL MOD A4C: 87 ML (ref 22–52)
ECHO LA VOL/BSA BIPLANE: 44 ML/M2 (ref 16–34)
ECHO LA VOLUME INDEX MOD A2C: 50 ML/M2 (ref 16–34)
ECHO LA VOLUME INDEX MOD A4C: 37 ML/M2 (ref 16–34)
ECHO LV E' LATERAL VELOCITY: 8 CM/S
ECHO LV E' SEPTAL VELOCITY: 8 CM/S
ECHO LV EDV A2C: 74 ML
ECHO LV EDV A4C: 80 ML
ECHO LV EDV INDEX A4C: 34 ML/M2
ECHO LV EDV NDEX A2C: 31 ML/M2
ECHO LV EJECTION FRACTION A2C: 66 %
ECHO LV EJECTION FRACTION A4C: 61 %
ECHO LV EJECTION FRACTION BIPLANE: 63 % (ref 55–100)
ECHO LV ESV A2C: 25 ML
ECHO LV ESV A4C: 31 ML
ECHO LV ESV INDEX A2C: 11 ML/M2
ECHO LV ESV INDEX A4C: 13 ML/M2
ECHO LV FRACTIONAL SHORTENING: 35 % (ref 28–44)
ECHO LV INTERNAL DIMENSION DIASTOLE INDEX: 2.07 CM/M2
ECHO LV INTERNAL DIMENSION DIASTOLIC: 4.9 CM (ref 3.9–5.3)
ECHO LV INTERNAL DIMENSION SYSTOLIC INDEX: 1.35 CM/M2
ECHO LV INTERNAL DIMENSION SYSTOLIC: 3.2 CM
ECHO LV IVSD: 1.2 CM (ref 0.6–0.9)
ECHO LV MASS 2D: 226.4 G (ref 67–162)
ECHO LV MASS INDEX 2D: 95.5 G/M2 (ref 43–95)
ECHO LV POSTERIOR WALL DIASTOLIC: 1.2 CM (ref 0.6–0.9)
ECHO LV RELATIVE WALL THICKNESS RATIO: 0.49
ECHO LVOT AREA: 4.2 CM2
ECHO LVOT AV VTI INDEX: 0.29
ECHO LVOT DIAM: 2.3 CM
ECHO LVOT MEAN GRADIENT: 3 MMHG
ECHO LVOT PEAK GRADIENT: 5 MMHG
ECHO LVOT PEAK VELOCITY: 1.1 M/S
ECHO LVOT STROKE VOLUME INDEX: 56.4 ML/M2
ECHO LVOT SV: 133.7 ML
ECHO LVOT VTI: 32.2 CM
ECHO MAIN PULMONARY ARTERY DIAMETER: 2.5 CM
ECHO MV A VELOCITY: 0.78 M/S
ECHO MV AREA VTI: 3.3 CM2
ECHO MV E DECELERATION TIME (DT): 372 MS
ECHO MV E VELOCITY: 1.23 M/S
ECHO MV E/A RATIO: 1.58
ECHO MV E/E' LATERAL: 15.38
ECHO MV E/E' RATIO (AVERAGED): 15.38
ECHO MV E/E' SEPTAL: 15.38
ECHO MV LVOT VTI INDEX: 1.27
ECHO MV MAX VELOCITY: 1.4 M/S
ECHO MV MEAN GRADIENT: 2 MMHG
ECHO MV MEAN VELOCITY: 0.6 M/S
ECHO MV PEAK GRADIENT: 8 MMHG
ECHO MV VTI: 41 CM
ECHO PULMONARY ARTERY END DIASTOLIC PRESSURE: 15 MMHG
ECHO PULMONARY ARTERY END DIASTOLIC PRESSURE: 3 MMHG
ECHO PV MAX VELOCITY: 1.3 M/S
ECHO PV MAX VELOCITY: 1.9 M/S
ECHO PV PEAK GRADIENT: 6 MMHG
ECHO PV REGURGITANT MAX VELOCITY: 0.9 M/S
ECHO RA AREA 4C: 21.6 CM2
ECHO RA END SYSTOLIC VOLUME APICAL 4 CHAMBER INDEX BSA: 26 ML/M2
ECHO RA VOLUME: 62 ML
ECHO RIGHT VENTRICULAR SYSTOLIC PRESSURE (RVSP): 35 MMHG
ECHO RV BASAL DIMENSION: 3.6 CM
ECHO RV LONGITUDINAL DIMENSION: 7.3 CM
ECHO RV MID DIMENSION: 2.9 CM
ECHO RV TAPSE: 1.8 CM (ref 1.7–?)
ECHO TV REGURGITANT MAX VELOCITY: 2.82 M/S
ECHO TV REGURGITANT PEAK GRADIENT: 30 MMHG
EKG DIAGNOSIS: NORMAL
EKG Q-T INTERVAL: 306 MS
EKG QRS DURATION: 82 MS
EKG QTC CALCULATION (BAZETT): 468 MS
EKG R AXIS: 32 DEGREES
EKG T AXIS: -6 DEGREES
EKG VENTRICULAR RATE: 141 BPM
ERYTHROCYTE [DISTWIDTH] IN BLOOD BY AUTOMATED COUNT: 13.2 % (ref 11.6–14.5)
EST. AVERAGE GLUCOSE BLD GHB EST-MCNC: 117 MG/DL
GLUCOSE SERPL-MCNC: 113 MG/DL (ref 74–99)
HBA1C MFR BLD: 5.7 % (ref 4.2–5.6)
HCT VFR BLD AUTO: 40.9 % (ref 35–45)
HDLC SERPL-MCNC: 50 MG/DL (ref 40–60)
HDLC SERPL: 3.8 (ref 0–5)
HGB BLD-MCNC: 13.6 G/DL (ref 12–16)
INR PPP: 1 (ref 0.9–1.1)
LDLC SERPL CALC-MCNC: 121 MG/DL (ref 0–100)
LIPID PANEL: ABNORMAL
MCH RBC QN AUTO: 27.6 PG (ref 24–34)
MCHC RBC AUTO-ENTMCNC: 33.3 G/DL (ref 31–37)
MCV RBC AUTO: 83 FL (ref 78–100)
NRBC # BLD: 0 K/UL (ref 0–0.01)
NRBC BLD-RTO: 0 PER 100 WBC
PLATELET # BLD AUTO: 255 K/UL (ref 135–420)
PMV BLD AUTO: 10.4 FL (ref 9.2–11.8)
POTASSIUM SERPL-SCNC: 3.6 MMOL/L (ref 3.5–5.5)
PROTHROMBIN TIME: 13.7 SEC (ref 11.9–14.9)
RBC # BLD AUTO: 4.93 M/UL (ref 4.2–5.3)
SODIUM SERPL-SCNC: 140 MMOL/L (ref 136–145)
TRIGL SERPL-MCNC: 105 MG/DL
TROPONIN I SERPL HS-MCNC: 112 NG/L (ref 0–54)
VLDLC SERPL CALC-MCNC: 21 MG/DL
WBC # BLD AUTO: 6.5 K/UL (ref 4.6–13.2)

## 2024-07-29 PROCEDURE — 6370000000 HC RX 637 (ALT 250 FOR IP): Performed by: INTERNAL MEDICINE

## 2024-07-29 PROCEDURE — 2580000003 HC RX 258: Performed by: NURSE PRACTITIONER

## 2024-07-29 PROCEDURE — 84484 ASSAY OF TROPONIN QUANT: CPT

## 2024-07-29 PROCEDURE — 2500000003 HC RX 250 WO HCPCS: Performed by: EMERGENCY MEDICINE

## 2024-07-29 PROCEDURE — 85027 COMPLETE CBC AUTOMATED: CPT

## 2024-07-29 PROCEDURE — 80048 BASIC METABOLIC PNL TOTAL CA: CPT

## 2024-07-29 PROCEDURE — 83036 HEMOGLOBIN GLYCOSYLATED A1C: CPT

## 2024-07-29 PROCEDURE — 93306 TTE W/DOPPLER COMPLETE: CPT

## 2024-07-29 PROCEDURE — 36415 COLL VENOUS BLD VENIPUNCTURE: CPT

## 2024-07-29 PROCEDURE — 80061 LIPID PANEL: CPT

## 2024-07-29 PROCEDURE — 85610 PROTHROMBIN TIME: CPT

## 2024-07-29 RX ORDER — METOPROLOL TARTRATE 50 MG/1
50 TABLET, FILM COATED ORAL 2 TIMES DAILY
Status: DISCONTINUED | OUTPATIENT
Start: 2024-07-29 | End: 2024-07-29

## 2024-07-29 RX ORDER — METOPROLOL TARTRATE 50 MG/1
50 TABLET, FILM COATED ORAL 2 TIMES DAILY
Qty: 60 TABLET | Refills: 3 | Status: SHIPPED | OUTPATIENT
Start: 2024-07-29 | End: 2024-07-29

## 2024-07-29 RX ADMIN — APIXABAN 5 MG: 5 TABLET, FILM COATED ORAL at 09:24

## 2024-07-29 RX ADMIN — SODIUM CHLORIDE, PRESERVATIVE FREE 10 ML: 5 INJECTION INTRAVENOUS at 09:28

## 2024-07-29 RX ADMIN — METOPROLOL TARTRATE 25 MG: 25 TABLET, FILM COATED ORAL at 09:24

## 2024-07-29 RX ADMIN — Medication 5 MG/HR: at 01:47

## 2024-07-29 ASSESSMENT — PAIN SCALES - GENERAL
PAINLEVEL_OUTOF10: 0

## 2024-07-29 NOTE — PROGRESS NOTES
Soni Botello  1965    Shift report received from off going primary nurse on 07/29/24: Giuliana Schaefer, RN     Bedside report received  Medications reviewed   Plan of care reviewed  White Board updated    Breakfast tray provided and set up.  Tolerated well.     0900 Kelsey Cleaning NP - cardiology at bedside to assess patient  0907 Discussed plan of care with Dr. Ruiz, metoprolol dose reduced to 25 mg     0955 Call placed to Dr. Brewster's ([place]) office for follow up appointment   Will move up September 27th appointment to August 12th 0930 with MYRIAM Hubbard    Lunch tray provided and set up.

## 2024-07-29 NOTE — PLAN OF CARE
Problem: Chronic Conditions and Co-morbidities  Goal: Patient's chronic conditions and co-morbidity symptoms are monitored and maintained or improved  7/29/2024 1209 by Michaela Prado RN  Outcome: Adequate for Discharge  7/29/2024 0754 by Michaela Prado RN  Outcome: Progressing  7/29/2024 0537 by Giuliana Thompson RN  Outcome: Not Progressing     Problem: Cardiovascular - Adult  Goal: Absence of cardiac dysrhythmias or at baseline  7/29/2024 1209 by Michaela Prado RN  Outcome: Adequate for Discharge  7/29/2024 0754 by Michaela Prado RN  Outcome: Progressing  7/29/2024 0537 by Giuliana Thompson RN  Outcome: Not Progressing     Problem: Metabolic/Fluid and Electrolytes - Adult  Goal: Electrolytes maintained within normal limits  7/29/2024 1209 by Michaela Prado RN  Outcome: Adequate for Discharge  7/29/2024 0754 by Michaela Prado RN  Outcome: Progressing  7/29/2024 0537 by Giuliana Thompson RN  Outcome: Not Progressing     
  Problem: Discharge Planning  Goal: Discharge to home or other facility with appropriate resources  7/29/2024 0754 by Michaela Prado RN  Outcome: Progressing  7/28/2024 1838 by Nikole Alejo RN  Outcome: Progressing     Problem: Chronic Conditions and Co-morbidities  Goal: Patient's chronic conditions and co-morbidity symptoms are monitored and maintained or improved  7/29/2024 0754 by Michaela Prado RN  Outcome: Progressing  7/29/2024 0537 by Giuliana Thompson RN  Outcome: Not Progressing  7/28/2024 1838 by Nikole Alejo RN  Outcome: Progressing     Problem: Safety - Adult  Goal: Free from fall injury  7/29/2024 0754 by Michaela Prado RN  Outcome: Progressing  7/29/2024 0537 by Giuliana Thompson RN  Outcome: Progressing  7/28/2024 1838 by Nikole Alejo RN  Outcome: Progressing     Problem: Pain  Goal: Verbalizes/displays adequate comfort level or baseline comfort level  7/29/2024 0754 by Michaela Prado RN  Outcome: Progressing  7/29/2024 0537 by Giuliana Thompson RN  Outcome: Progressing  Flowsheets (Taken 7/28/2024 1840 by Nikole Alejo RN)  Verbalizes/displays adequate comfort level or baseline comfort level:   Encourage patient to monitor pain and request assistance   Assess pain using appropriate pain scale  7/28/2024 1838 by Nikole Alejo RN  Outcome: Progressing     Problem: Cardiovascular - Adult  Goal: Maintains optimal cardiac output and hemodynamic stability  7/29/2024 0754 by Michaela Prado RN  Outcome: Progressing  7/29/2024 0537 by Giuliana Thompson RN  Outcome: Progressing  Goal: Absence of cardiac dysrhythmias or at baseline  7/29/2024 0754 by Michaela Prado RN  Outcome: Progressing  7/29/2024 0537 by Giuliana Thompson RN  Outcome: Not Progressing     Problem: Metabolic/Fluid and Electrolytes - Adult  Goal: Electrolytes maintained within normal limits  7/29/2024 0754 by Michaela Prado RN  Outcome: Progressing  7/29/2024 0537 by Jay 
  Problem: Discharge Planning  Goal: Discharge to home or other facility with appropriate resources  Outcome: Progressing     Problem: Chronic Conditions and Co-morbidities  Goal: Patient's chronic conditions and co-morbidity symptoms are monitored and maintained or improved  Outcome: Progressing     Problem: Safety - Adult  Goal: Free from fall injury  Outcome: Progressing     Problem: Pain  Goal: Verbalizes/displays adequate comfort level or baseline comfort level  Outcome: Progressing     
Progressing     Problem: Cardiovascular - Adult  Goal: Absence of cardiac dysrhythmias or at baseline  Outcome: Not Progressing     Problem: Metabolic/Fluid and Electrolytes - Adult  Goal: Electrolytes maintained within normal limits  Outcome: Not Progressing

## 2024-07-29 NOTE — FLOWSHEET NOTE
07/28/24 2310   Treatment Team Notification   Reason for Communication Critical results   Type of Critical Result Laboratory   Critical Lab Information Troponin 121   Person Result Received From Fredo   Critical Lab Result Type Troponin   Name of Team Member Notified ARIES Méndez NP   Treatment Team Role Advanced Practice Nurse   Method of Communication Call   Response No new orders   Notification Time 2743

## 2024-07-29 NOTE — PROGRESS NOTES
-1855 Received care of pt from off going nurse.  Pt lying in bed.  Cardizem drip @ 12.5 mg/hr (SEE MAR for further titrations).    -1905 PM Assessment completed.  A&OX4.  Resp even and non-labored.  Lungs clear, SATS 94-99% on RA.  Skin warm and dry.  Trace edema to BLE.  Pt denies any complaints at present.   CBWR, bed in lowest position.  NAD noted.    -1930 Pt daughter into see pt.     -1944 Scheduled dose K+ 40 meq po given.    -2113 Lovenox 120 mg sub-q given per NP orders.  Pt repositioning self for comfort.     -2233  into draw scheduled labs.    -2252 Pt c/o \"dull, achig HA.\"  Pt rated pain @ \"7\"  on pain scale.  Tylenol 650 mg po given.    -2322 Pt easily arousal at present.  Pt stated, \"my headache is better.\"  Pt rated pain @ \"4\" on pain scale.    -0002 Pt resting quietly in bed with eyes closed.  HR 's, Afib  on cardiac monitor. SATS 97% on RA.  NAD noted.     -0120 HR 69, NSR on cardiac monitor.    -0208 Hourly rounding done at this time.  Pt resting quietly in bed.  VSS.      -0410 HR 58, sinus michelle on cardiac monitor.  NAD noted.    -0440  into draw am labs.     -0520 Pt HR 48-49, sinus michelle on cardiac monitor.  Cardizem drip stopped.  ARIES Méndez NP notified.    -0615 Pt resting quietly in bed with eyes closed.     -0703 Bedside shift change report given to ALEX Prado, CHELSEA/ ESTEE Whitney, RN (oncoming nurse) by CLEM Schaefer RN (offgoing nurse). Report included the following information Intake/Output, MAR, Recent Results, and Cardiac Rhythm Sinus michelle .

## 2024-07-29 NOTE — PROGRESS NOTES
Admission Medication Reconciliation:    Information obtained from:  Patient    Comments/Recommendations: Reviewed PTA medications and patient's allergies.    Reviewed and updated. Pt no longer takes escitalopram, list reflects changes during this visit as well        Allergies:  Codeine and Ace inhibitors    Significant PMH/Disease States:   Past Medical History:   Diagnosis Date    Aortic valve disorders     Cough 2/5/2018 2/18 poss ace i induced. change to losartan    Degenerative joint disease     Essential hypertension, benign     CONTROLLED     Lumbar herniated disc     Mitral valve disorders(424.0)     8/09 MILD MR     Obesity, unspecified     Transfusion history     PATIENT HAS A HX OF RECEIVING BLOOD OR BLOOD PRODUCT TRANSFUSION(S)     Chief Complaint for this Admission:    Chief Complaint   Patient presents with    Palpitations     Prior to Admission Medications:   Prior to Admission medications    Medication Sig Start Date End Date Taking? Authorizing Provider   apixaban (ELIQUIS) 5 MG TABS tablet Take 1 tablet by mouth 2 times daily 7/29/24  Yes Gallo Ruiz MD   metoprolol tartrate (LOPRESSOR) 25 MG tablet Take 1 tablet by mouth 2 times daily 7/29/24  Yes Gallo Ruiz MD   metoprolol tartrate (LOPRESSOR) 50 MG tablet Take 1 tablet by mouth 2 times daily 7/29/24 7/29/24  Gallo Ruiz MD   amLODIPine (NORVASC) 5 MG tablet Take 1 tablet by mouth daily 11/20/23   Shanae Castellanos MD   losartan (COZAAR) 25 MG tablet Take 1 tablet by mouth daily 11/11/23 7/29/24  Shanae Castellanos MD   hydroCHLOROthiazide (MICROZIDE) 12.5 MG capsule Take 1 capsule by mouth daily 11/11/23 7/29/24  Shanae Castellanos MD   escitalopram (LEXAPRO) 10 MG tablet Take 1 tablet by mouth daily 11/11/23 7/29/24  Shanae Castellanos MD   zolpidem (AMBIEN) 5 MG tablet Take 1 tablet by mouth nightly as needed for Sleep.    Automatic Reconciliation, Hamilton Omer RPH

## 2024-07-29 NOTE — DISCHARGE SUMMARY
mild midsternal pressure, neck pain and diaphoresis.  Patient reports symptoms started about 115 and lasted until about 330 at that time is when she activated EMS and was brought to the ED.  Patient denies any fever, chills, left-sided chest pain, shortness of breath, nausea, vomiting, and diarrhea.  Patient endorses a longstanding history of mitral valve regurgitation and aortic stenosis, in which she is seeing cardiologist Dr. Brewster, where has been discussed that patient will need a mitral valve replacement, but was told she should lose weight prior to getting it done.  While in the ED labs and imaging was done and reviewed which shows a potassium of 3.3, a TSH of 2.35, D-dimer of 0.49, chest x-ray did not reveal any acute cardiopulmonary abnormalities, EKG did show atrial fibrillation with RVR with a rate of 141.  While in the ED patient was given a bolus of 10 mg Cardizem and was started on a Cardizem drip.  Patient was assessed at bedside while in the ED patient is alert and oriented x 3, there are no acute signs or symptoms of distress noted at this time.  Patient agrees to admission for new onset of atrial fibrillation, treatment to include continue Cardizem drip until rate is less than 100 or patient has converted to sinus rhythm, cardiology consultation, repeat echo, and cardiology monitoring.     Hospital Course and Discharge Diagnosis   The patient admitted for the following Principal Medical Problem:   Atrial Fibrillation, New Onset  -EKG: atrial fibrillation with , TSH and Lytes normal   Admitted to ICU , Tele   -Cardizem 10 mg via IV given and then patient started on Cardizem gtt  -Lovenox BID started  -ECHO ordered  -cardiology consult for Outpatient follow up with his Cardiology   - [id number] score ( Female, Valvular heart disease, HTN)   - Started on Eliquis 5 mg BID and Metoprolol 12.5 mg BID   Hypokalemia  -Mild potassium 3.3, replaced with 40 MEQ oral  Hypertension  -chronic,

## 2024-07-29 NOTE — PROGRESS NOTES
Medication Discharge Counseling:      Comments    Spoke with patient about initiating eliquis and metoprolol and stopping losartan and hctz. Went over effects and potential side effects and answered all questions           Medication List        START taking these medications      apixaban 5 MG Tabs tablet  Commonly known as: Eliquis  Take 1 tablet by mouth 2 times daily     metoprolol tartrate 25 MG tablet  Commonly known as: LOPRESSOR  Take 1 tablet by mouth 2 times daily            CONTINUE taking these medications      zolpidem 5 MG tablet  Commonly known as: AMBIEN            STOP taking these medications      escitalopram 10 MG tablet  Commonly known as: LEXAPRO     hydroCHLOROthiazide 12.5 MG capsule     losartan 25 MG tablet  Commonly known as: COZAAR            ASK your doctor about these medications      amLODIPine 5 MG tablet  Commonly known as: NORVASC               Where to Get Your Medications        These medications were sent to [facility] [address] [place] [address] - [address] [facility] -  [phone] - F [phone] [address] [facility] [address]      Phone: [phone]   apixaban 5 MG Tabs tablet  metoprolol tartrate 25 MG tablet           Max Omer RPH

## 2024-07-29 NOTE — DISCHARGE INSTRUCTIONS
- Encourage the ambulation with fall precautions  - Encourage good hydration  - Compliance with medication , diet and follow up appointments  - Schedule a follow up with PCP / Cardiology in 1 weeks  -Take all medications with you to all your follow up appointments  - New / Changes in home medications:  1- Stopped Losartan and HCTZ, continue with Norvasc 5 mg ,   2- NEW Added Metoprolol 25 mg BID   3- NEW Eliquis 5 mg BID

## 2024-07-29 NOTE — PROGRESS NOTES
CARDIOLOGY CONSULTATION    REASON FOR CONSULT: Atrial fibrillation RVR    REQUESTING PROVIDER: DENIZ Benjamin     CHIEF COMPLAINT: Chest pressure    HISTORY OF PRESENT ILLNESS:  Soni Botello is a 58 y.o. year-old female with past medical history significant for hypertension, aortic valve stenosis, mitral valve regurgitation, and PFO, who was evaluated today due to new onset atrial fibrillation.  Patient presented to the emergency department over the weekend with chief complaint of palpitations and chest pressure.  Patient reports after attending [employer] she became diaphoretic with a racing heart and mild chest pressure with some radiating features to her neck.  She denied any shortness of breath with event.  Endorses ongoing fatigue at home.  She is followed by cardiologist Dr. Brewster, who is following her mitral valve regurgitation and aortic stenosis.  Patient was started on a Cardizem drip in the emergency department.  At bedside this morning she has converted back to sinus rhythm with a heart rate in the 50-60s, Cardizem drip discontinued and started on low-dose metoprolol.  Denies any chest pain or pressure this morning.  Denies shortness of breath or palpitations.  No nausea or vomiting.     Records from hospital admission course thus far reviewed.      Telemetry reviewed.  No avute events overnight. SB-SR on telemetry.     INPATIENT MEDICATIONS:  Home medications reviewed.    Current Facility-Administered Medications:     apixaban (ELIQUIS) tablet 5 mg, 5 mg, Oral, BID, Gallo Ruiz MD, 5 mg at 07/29/24 0924    metoprolol tartrate (LOPRESSOR) tablet 25 mg, 25 mg, Oral, BID, Gallo Ruiz MD, 25 mg at 07/29/24 0924    dilTIAZem 125 mg in 0.9% sodium chloride 125 mL infusion, 2.5-15 mg/hr, IntraVENous, Continuous, Karina Galloway MD, Stopped at 07/29/24 0520    dilTIAZem injection 20 mg, 20 mg, IntraVENous, Once, Karina Galloway MD    sodium chloride flush 0.9 %

## 2024-07-31 ENCOUNTER — FOLLOWUP TELEPHONE ENCOUNTER (OUTPATIENT)
Age: 59
End: 2024-07-31

## 2024-08-12 ENCOUNTER — OFFICE VISIT (OUTPATIENT)
Age: 59
End: 2024-08-12
Payer: COMMERCIAL

## 2024-08-12 VITALS
BODY MASS INDEX: 41.03 KG/M2 | HEIGHT: 69 IN | WEIGHT: 277 LBS | DIASTOLIC BLOOD PRESSURE: 84 MMHG | HEART RATE: 50 BPM | SYSTOLIC BLOOD PRESSURE: 160 MMHG | OXYGEN SATURATION: 98 %

## 2024-08-12 DIAGNOSIS — Q21.12 PATENT FORAMEN OVALE: ICD-10-CM

## 2024-08-12 DIAGNOSIS — I35.0 SEVERE AORTIC STENOSIS: ICD-10-CM

## 2024-08-12 DIAGNOSIS — E66.01 SEVERE OBESITY WITH BODY MASS INDEX (BMI) OF 35.0 TO 39.9 WITH SERIOUS COMORBIDITY (HCC): ICD-10-CM

## 2024-08-12 DIAGNOSIS — R00.2 PALPITATIONS: ICD-10-CM

## 2024-08-12 DIAGNOSIS — I48.91 NEW ONSET A-FIB (HCC): ICD-10-CM

## 2024-08-12 DIAGNOSIS — I10 ESSENTIAL HYPERTENSION, BENIGN: Primary | ICD-10-CM

## 2024-08-12 PROCEDURE — 99215 OFFICE O/P EST HI 40 MIN: CPT | Performed by: NURSE PRACTITIONER

## 2024-08-12 PROCEDURE — 93000 ELECTROCARDIOGRAM COMPLETE: CPT | Performed by: NURSE PRACTITIONER

## 2024-08-12 PROCEDURE — 3077F SYST BP >= 140 MM HG: CPT | Performed by: NURSE PRACTITIONER

## 2024-08-12 PROCEDURE — 3079F DIAST BP 80-89 MM HG: CPT | Performed by: NURSE PRACTITIONER

## 2024-08-12 RX ORDER — POTASSIUM CHLORIDE 20 MEQ/1
20 TABLET, EXTENDED RELEASE ORAL DAILY
Qty: 90 TABLET | Refills: 3 | Status: SHIPPED | OUTPATIENT
Start: 2024-08-12

## 2024-08-12 RX ORDER — HYDROCHLOROTHIAZIDE 12.5 MG/1
12.5 CAPSULE, GELATIN COATED ORAL EVERY MORNING
Qty: 90 CAPSULE | Refills: 3 | Status: SHIPPED | OUTPATIENT
Start: 2024-08-12

## 2024-08-12 RX ORDER — LOSARTAN POTASSIUM 25 MG/1
25 TABLET ORAL DAILY
Qty: 1 TABLET | Refills: 0
Start: 2024-08-12

## 2024-08-12 ASSESSMENT — PATIENT HEALTH QUESTIONNAIRE - PHQ9
1. LITTLE INTEREST OR PLEASURE IN DOING THINGS: NOT AT ALL
8. MOVING OR SPEAKING SO SLOWLY THAT OTHER PEOPLE COULD HAVE NOTICED. OR THE OPPOSITE, BEING SO FIGETY OR RESTLESS THAT YOU HAVE BEEN MOVING AROUND A LOT MORE THAN USUAL: NOT AT ALL
SUM OF ALL RESPONSES TO PHQ QUESTIONS 1-9: 0
9. THOUGHTS THAT YOU WOULD BE BETTER OFF DEAD, OR OF HURTING YOURSELF: NOT AT ALL
SUM OF ALL RESPONSES TO PHQ QUESTIONS 1-9: 0
3. TROUBLE FALLING OR STAYING ASLEEP: NOT AT ALL
7. TROUBLE CONCENTRATING ON THINGS, SUCH AS READING THE NEWSPAPER OR WATCHING TELEVISION: NOT AT ALL
SUM OF ALL RESPONSES TO PHQ9 QUESTIONS 1 & 2: 0
5. POOR APPETITE OR OVEREATING: NOT AT ALL
SUM OF ALL RESPONSES TO PHQ QUESTIONS 1-9: 0
4. FEELING TIRED OR HAVING LITTLE ENERGY: NOT AT ALL
6. FEELING BAD ABOUT YOURSELF - OR THAT YOU ARE A FAILURE OR HAVE LET YOURSELF OR YOUR FAMILY DOWN: NOT AT ALL
SUM OF ALL RESPONSES TO PHQ QUESTIONS 1-9: 0
2. FEELING DOWN, DEPRESSED OR HOPELESS: NOT AT ALL
10. IF YOU CHECKED OFF ANY PROBLEMS, HOW DIFFICULT HAVE THESE PROBLEMS MADE IT FOR YOU TO DO YOUR WORK, TAKE CARE OF THINGS AT HOME, OR GET ALONG WITH OTHER PEOPLE: NOT DIFFICULT AT ALL

## 2024-08-12 ASSESSMENT — ENCOUNTER SYMPTOMS
ABDOMINAL DISTENTION: 0
COUGH: 0
CONSTIPATION: 0
VOMITING: 0
NAUSEA: 0
CHEST TIGHTNESS: 0
BLOOD IN STOOL: 0
DIARRHEA: 0
SHORTNESS OF BREATH: 0
WHEEZING: 0

## 2024-08-12 NOTE — PATIENT INSTRUCTIONS
Restart losartan 25mg once a day  Restart HCTZ 12.5mg once a day  Continue metoprolol tartrate 12.5mg twice a day  Continue Eliquis 5mg twice a day  Begin potassium chloride 20meq once a day  Follow-up with Dr. Brewster as scheduled and as needed

## 2024-08-12 NOTE — PROGRESS NOTES
Soni Botello presents today for   Chief Complaint   Patient presents with    Follow-up     4 month f/u     ED Follow-up     ED 7/28 due to new onset of AFIB     Palpitations     Fluttering palps     Dizziness     Dizzy spells        Soni Botello preferred language for health care discussion is english/other.    Is someone accompanying this pt? yes    Is the patient using any DME equipment during OV? no    Depression Screening:  Depression: Not at risk (8/12/2024)    PHQ-2     PHQ-2 Score: 0        Learning Assessment:  No question data found.       Pt currently taking Anticoagulant therapy? no    Pt currently taking Antiplatelet therapy ? no      Coordination of Care:  1. Have you been to the ER, urgent care clinic since your last visit? Hospitalized since your last visit? yes    2. Have you seen or consulted any other health care providers outside of the [facility] since your last visit? Include any pap smears or colon screening. no    
  Gastrointestinal:  Negative for abdominal distention, blood in stool, constipation, diarrhea, nausea and vomiting.   Musculoskeletal:  Negative for arthralgias and myalgias.   Skin:  Negative for pallor.   Neurological:  Positive for dizziness (occasional (diagnosed with vertigo)). Negative for syncope, speech difficulty, weakness, light-headedness and numbness.   Psychiatric/Behavioral:  Negative for confusion.        Physical:  Vitals:  BP (!) 160/84 (Site: Left Upper Arm) Comment: rechecked by NP  Pulse 50   Ht 1.753 m (5' 9\")   Wt 125.6 kg (277 lb)   SpO2 98%   BMI 40.91 kg/m²     Exam:  Physical Exam  Constitutional:       Appearance: Normal appearance. She is obese.   HENT:      Head: Normocephalic and atraumatic.   Cardiovascular:      Rate and Rhythm: Normal rate and regular rhythm.      Heart sounds: Murmur (grade III/VI, harsh, at the right upper sternal border with radiation to the neck) heard.   Pulmonary:      Effort: Pulmonary effort is normal.      Breath sounds: Normal breath sounds.   Abdominal:      General: Bowel sounds are normal.      Palpations: Abdomen is soft.   Musculoskeletal:      Cervical back: Normal range of motion and neck supple.      Right lower leg: Edema (1+ around ankle) present.      Left lower leg: Edema (1+ around ankle) present.   Skin:     General: Skin is warm and dry.   Neurological:      General: No focal deficit present.      Mental Status: She is alert and oriented to person, place, and time.   Psychiatric:         Mood and Affect: Mood normal.         Behavior: Behavior normal.         Thought Content: Thought content normal.          Data:  EKG:    LABS:  Lab Results   Component Value Date/Time     07/29/2024 04:40 AM    K 3.6 07/29/2024 04:40 AM     07/29/2024 04:40 AM    CO2 24 07/29/2024 04:40 AM    BUN 11 07/29/2024 04:40 AM     Lab Results   Component Value Date/Time    CHOL 192 07/29/2024 04:40 AM    HDL 50 07/29/2024 04:40 AM

## 2024-08-31 ENCOUNTER — HOSPITAL ENCOUNTER (OUTPATIENT)
Age: 59
Discharge: HOME | End: 2024-09-03
Payer: COMMERCIAL

## 2024-08-31 DIAGNOSIS — I10 ESSENTIAL HYPERTENSION, BENIGN: ICD-10-CM

## 2024-08-31 DIAGNOSIS — I48.91 NEW ONSET A-FIB (HCC): ICD-10-CM

## 2024-08-31 LAB
ANION GAP SERPL CALC-SCNC: 10 MMOL/L (ref 3–18)
BUN SERPL-MCNC: 17 MG/DL (ref 7–18)
BUN/CREAT SERPL: 24 (ref 12–20)
CA-I BLD-MCNC: 9.4 MG/DL (ref 8.5–10.1)
CHLORIDE SERPL-SCNC: 107 MMOL/L (ref 100–111)
CO2 SERPL-SCNC: 27 MMOL/L (ref 21–32)
CREAT SERPL-MCNC: 0.72 MG/DL (ref 0.6–1.3)
GLUCOSE SERPL-MCNC: 119 MG/DL (ref 74–99)
POTASSIUM SERPL-SCNC: 4.1 MMOL/L (ref 3.5–5.5)
SODIUM SERPL-SCNC: 144 MMOL/L (ref 136–145)

## 2024-08-31 PROCEDURE — 84439 ASSAY OF FREE THYROXINE: CPT

## 2024-08-31 PROCEDURE — 84443 ASSAY THYROID STIM HORMONE: CPT

## 2024-08-31 PROCEDURE — 36415 COLL VENOUS BLD VENIPUNCTURE: CPT

## 2024-08-31 PROCEDURE — 80048 BASIC METABOLIC PNL TOTAL CA: CPT

## 2024-09-01 LAB
T4 FREE SERPL-MCNC: 1.1 NG/DL (ref 0.8–1.5)
TSH SERPL DL<=0.05 MIU/L-ACNC: 1.86 UIU/ML (ref 0.36–3.74)

## 2024-10-14 ENCOUNTER — OFFICE VISIT (OUTPATIENT)
Age: 59
End: 2024-10-14
Payer: COMMERCIAL

## 2024-10-14 VITALS
OXYGEN SATURATION: 98 % | SYSTOLIC BLOOD PRESSURE: 130 MMHG | DIASTOLIC BLOOD PRESSURE: 82 MMHG | WEIGHT: 282 LBS | BODY MASS INDEX: 41.77 KG/M2 | HEIGHT: 69 IN | HEART RATE: 53 BPM

## 2024-10-14 DIAGNOSIS — E66.01 SEVERE OBESITY WITH BODY MASS INDEX (BMI) OF 35.0 TO 39.9 WITH SERIOUS COMORBIDITY: ICD-10-CM

## 2024-10-14 DIAGNOSIS — I35.0 SEVERE AORTIC STENOSIS: Primary | ICD-10-CM

## 2024-10-14 DIAGNOSIS — I10 ESSENTIAL HYPERTENSION, BENIGN: ICD-10-CM

## 2024-10-14 DIAGNOSIS — Q21.12 PATENT FORAMEN OVALE: ICD-10-CM

## 2024-10-14 DIAGNOSIS — I48.0 PAROXYSMAL ATRIAL FIBRILLATION (HCC): ICD-10-CM

## 2024-10-14 PROCEDURE — 93000 ELECTROCARDIOGRAM COMPLETE: CPT | Performed by: INTERNAL MEDICINE

## 2024-10-14 PROCEDURE — 99215 OFFICE O/P EST HI 40 MIN: CPT | Performed by: INTERNAL MEDICINE

## 2024-10-14 PROCEDURE — 3079F DIAST BP 80-89 MM HG: CPT | Performed by: INTERNAL MEDICINE

## 2024-10-14 PROCEDURE — 3075F SYST BP GE 130 - 139MM HG: CPT | Performed by: INTERNAL MEDICINE

## 2024-10-14 ASSESSMENT — ANXIETY QUESTIONNAIRES
5. BEING SO RESTLESS THAT IT IS HARD TO SIT STILL: NOT AT ALL
GAD7 TOTAL SCORE: 0
7. FEELING AFRAID AS IF SOMETHING AWFUL MIGHT HAPPEN: NOT AT ALL
6. BECOMING EASILY ANNOYED OR IRRITABLE: NOT AT ALL
1. FEELING NERVOUS, ANXIOUS, OR ON EDGE: NOT AT ALL
4. TROUBLE RELAXING: NOT AT ALL
2. NOT BEING ABLE TO STOP OR CONTROL WORRYING: NOT AT ALL
3. WORRYING TOO MUCH ABOUT DIFFERENT THINGS: NOT AT ALL

## 2024-10-14 ASSESSMENT — PATIENT HEALTH QUESTIONNAIRE - PHQ9
7. TROUBLE CONCENTRATING ON THINGS, SUCH AS READING THE NEWSPAPER OR WATCHING TELEVISION: NOT AT ALL
5. POOR APPETITE OR OVEREATING: NOT AT ALL
2. FEELING DOWN, DEPRESSED OR HOPELESS: NOT AT ALL
6. FEELING BAD ABOUT YOURSELF - OR THAT YOU ARE A FAILURE OR HAVE LET YOURSELF OR YOUR FAMILY DOWN: NOT AT ALL
9. THOUGHTS THAT YOU WOULD BE BETTER OFF DEAD, OR OF HURTING YOURSELF: NOT AT ALL
SUM OF ALL RESPONSES TO PHQ QUESTIONS 1-9: 0
4. FEELING TIRED OR HAVING LITTLE ENERGY: NOT AT ALL
10. IF YOU CHECKED OFF ANY PROBLEMS, HOW DIFFICULT HAVE THESE PROBLEMS MADE IT FOR YOU TO DO YOUR WORK, TAKE CARE OF THINGS AT HOME, OR GET ALONG WITH OTHER PEOPLE: NOT DIFFICULT AT ALL
SUM OF ALL RESPONSES TO PHQ QUESTIONS 1-9: 0
SUM OF ALL RESPONSES TO PHQ QUESTIONS 1-9: 0
8. MOVING OR SPEAKING SO SLOWLY THAT OTHER PEOPLE COULD HAVE NOTICED. OR THE OPPOSITE, BEING SO FIGETY OR RESTLESS THAT YOU HAVE BEEN MOVING AROUND A LOT MORE THAN USUAL: NOT AT ALL
1. LITTLE INTEREST OR PLEASURE IN DOING THINGS: NOT AT ALL
SUM OF ALL RESPONSES TO PHQ9 QUESTIONS 1 & 2: 0
SUM OF ALL RESPONSES TO PHQ QUESTIONS 1-9: 0
3. TROUBLE FALLING OR STAYING ASLEEP: NOT AT ALL

## 2024-10-14 ASSESSMENT — ENCOUNTER SYMPTOMS
ABDOMINAL DISTENTION: 0
SORE THROAT: 0
COUGH: 0
VOMITING: 0
SHORTNESS OF BREATH: 0
NAUSEA: 0
ABDOMINAL PAIN: 0

## 2024-10-14 NOTE — PROGRESS NOTES
Soni Botello presents today for   Chief Complaint   Patient presents with    Follow-up     Overdue 3 month       Soni Botello preferred language for health care discussion is english/other.    Is someone accompanying this pt? no    Is the patient using any DME equipment during OV? no    Depression Screening:  Depression: Not at risk (10/14/2024)    PHQ-2     PHQ-2 Score: 0        Learning Assessment:  Who is the primary learner? Patient    What is the preferred language for health care of the primary learner? ENGLISH    How does the primary learner prefer to learn new concepts? DEMONSTRATION    Answered By patient    Relationship to Learner SELF           Pt currently taking Anticoagulant therapy? Eliquis 5 mg bid    Pt currently taking Antiplatelet therapy ? no      Coordination of Care:  1. Have you been to the ER, urgent care clinic since your last visit? Hospitalized since your last visit? no    2. Have you seen or consulted any other health care providers outside of the [facility] since your last visit? Include any pap smears or colon screening. no

## 2024-10-14 NOTE — PROGRESS NOTES
10/14/24     Soni Botello  is a 58 y.o. female     Chief Complaint   Patient presents with    Follow-up     Overdue 3 month       HPI    Patient presents for an overdue follow-up office visit.  She was initially referred here by her PCP for evaluation of worsening aortic valve stenosis.  Patient reports having a cardiac murmur since the age of 17 which was first noticed when she was pregnant.  She also has history of hypertension, obesity, and anxiety.    She underwent a recent follow-up echocardiogram which was ordered by her PCP at the end of October 2023 which showed the aortic stenosis now in the severe range with a peak velocity of 4.4 m/s, mean gradient of 46 mmHg, calculated KARRI at 1.2 cm².  Probable aortic regurgitation.  Valve appeared functionally bicuspid.  Normal LVEF of 55 to 60% with mild concentric LVH.  There was a left-to-right shunt on color flow Doppler either representing a large PFO or secundum ASD.  Her mitral valve annulus was also significantly calcified.  Aortic root was normal in size.    Patient was briefly hospitalized at [facility] at the end of July 2024 for atrial fibrillation with rapid ventricular rates.  She was started on IV diltiazem and converted back to sinus rhythm.  She was discharged on a very low-dose of metoprolol and Eliquis for oral anticoagulation.  A repeat echocardiogram during that hospital stay again showed preserved EF of 63% and severe aortic stenosis with a peak velocity of 4.2 m/s.  She states that she has had no prolonged heart palpitations since leaving the hospital.  She has not noted any major change in her activity tolerance.  No exertional dyspnea exertional chest pain, dizziness or syncope.    She states she is going through a divorce and is very stressed.  As result she has gained about 12 pounds in weight.  She does notice increased leg swelling if she eats high sodium containing foods but as long as she stays away from sodium her

## 2024-11-22 RX ORDER — APIXABAN 5 MG/1
5 TABLET, FILM COATED ORAL 2 TIMES DAILY
Qty: 180 TABLET | Refills: 3 | Status: SHIPPED | OUTPATIENT
Start: 2024-11-22

## 2024-11-22 RX ORDER — METOPROLOL TARTRATE 25 MG/1
TABLET, FILM COATED ORAL
Qty: 90 TABLET | Refills: 3 | Status: SHIPPED | OUTPATIENT
Start: 2024-11-22

## 2024-12-09 ENCOUNTER — TELEPHONE (OUTPATIENT)
Age: 59
End: 2024-12-09

## 2024-12-09 NOTE — TELEPHONE ENCOUNTER
Patient called asking if it was safe for her to start Wegovy.  Will discuss with Dr. Brewster    Verbal order and read back per Don Brewster MD  Yes    This has been fully explained to the patient, who indicates understanding.

## 2025-06-19 ENCOUNTER — OFFICE VISIT (OUTPATIENT)
Age: 60
End: 2025-06-19
Payer: COMMERCIAL

## 2025-06-19 VITALS
SYSTOLIC BLOOD PRESSURE: 140 MMHG | OXYGEN SATURATION: 97 % | HEART RATE: 55 BPM | HEIGHT: 69 IN | BODY MASS INDEX: 42.51 KG/M2 | DIASTOLIC BLOOD PRESSURE: 80 MMHG | WEIGHT: 287 LBS

## 2025-06-19 DIAGNOSIS — I48.0 PAROXYSMAL ATRIAL FIBRILLATION (HCC): Primary | ICD-10-CM

## 2025-06-19 DIAGNOSIS — I35.0 NONRHEUMATIC AORTIC VALVE STENOSIS: ICD-10-CM

## 2025-06-19 DIAGNOSIS — I35.9 AORTIC VALVE DISEASE: ICD-10-CM

## 2025-06-19 DIAGNOSIS — R01.1 HEART MURMUR: ICD-10-CM

## 2025-06-19 DIAGNOSIS — R00.2 PALPITATIONS: ICD-10-CM

## 2025-06-19 DIAGNOSIS — I35.0 SEVERE AORTIC STENOSIS: ICD-10-CM

## 2025-06-19 DIAGNOSIS — E66.01 SEVERE OBESITY WITH BODY MASS INDEX (BMI) OF 35.0 TO 39.9 WITH SERIOUS COMORBIDITY (HCC): ICD-10-CM

## 2025-06-19 DIAGNOSIS — I10 ESSENTIAL HYPERTENSION, BENIGN: ICD-10-CM

## 2025-06-19 DIAGNOSIS — E78.5 HYPERLIPIDEMIA, UNSPECIFIED HYPERLIPIDEMIA TYPE: ICD-10-CM

## 2025-06-19 PROCEDURE — 3077F SYST BP >= 140 MM HG: CPT | Performed by: NURSE PRACTITIONER

## 2025-06-19 PROCEDURE — 3079F DIAST BP 80-89 MM HG: CPT | Performed by: NURSE PRACTITIONER

## 2025-06-19 PROCEDURE — 99215 OFFICE O/P EST HI 40 MIN: CPT | Performed by: NURSE PRACTITIONER

## 2025-06-19 PROCEDURE — 93000 ELECTROCARDIOGRAM COMPLETE: CPT | Performed by: NURSE PRACTITIONER

## 2025-06-19 ASSESSMENT — PATIENT HEALTH QUESTIONNAIRE - PHQ9
SUM OF ALL RESPONSES TO PHQ QUESTIONS 1-9: 0
2. FEELING DOWN, DEPRESSED OR HOPELESS: NOT AT ALL
SUM OF ALL RESPONSES TO PHQ QUESTIONS 1-9: 0
3. TROUBLE FALLING OR STAYING ASLEEP: NOT AT ALL
10. IF YOU CHECKED OFF ANY PROBLEMS, HOW DIFFICULT HAVE THESE PROBLEMS MADE IT FOR YOU TO DO YOUR WORK, TAKE CARE OF THINGS AT HOME, OR GET ALONG WITH OTHER PEOPLE: NOT DIFFICULT AT ALL
9. THOUGHTS THAT YOU WOULD BE BETTER OFF DEAD, OR OF HURTING YOURSELF: NOT AT ALL
6. FEELING BAD ABOUT YOURSELF - OR THAT YOU ARE A FAILURE OR HAVE LET YOURSELF OR YOUR FAMILY DOWN: NOT AT ALL
1. LITTLE INTEREST OR PLEASURE IN DOING THINGS: NOT AT ALL
4. FEELING TIRED OR HAVING LITTLE ENERGY: NOT AT ALL
SUM OF ALL RESPONSES TO PHQ QUESTIONS 1-9: 0
5. POOR APPETITE OR OVEREATING: NOT AT ALL
8. MOVING OR SPEAKING SO SLOWLY THAT OTHER PEOPLE COULD HAVE NOTICED. OR THE OPPOSITE, BEING SO FIGETY OR RESTLESS THAT YOU HAVE BEEN MOVING AROUND A LOT MORE THAN USUAL: NOT AT ALL
SUM OF ALL RESPONSES TO PHQ QUESTIONS 1-9: 0
7. TROUBLE CONCENTRATING ON THINGS, SUCH AS READING THE NEWSPAPER OR WATCHING TELEVISION: NOT AT ALL

## 2025-06-19 NOTE — PROGRESS NOTES
Soni Botello presents today for   Chief Complaint   Patient presents with    Follow-up       Soni Botello preferred language for health care discussion is english/other.    Is someone accompanying this pt? no    Is the patient using any DME equipment during OV? no    Depression Screening:  Depression: Not at risk (6/19/2025)    PHQ-2     PHQ-2 Score: 0        Learning Assessment:  Who is the primary learner? Patient    What is the preferred language for health care of the primary learner? ENGLISH    How does the primary learner prefer to learn new concepts? DEMONSTRATION    Answered By patient    Relationship to Learner SELF           Pt currently taking Anticoagulant therapy? eliquis    Pt currently taking Antiplatelet therapy ? no      Coordination of Care:  1. Have you been to the ER, urgent care clinic since your last visit? Hospitalized since your last visit? no    2. Have you seen or consulted any other health care providers outside of the [facility] since your last visit? Include any pap smears or colon screening. no    
2018 poss ace i induced. change to losartan    Degenerative joint disease     Essential hypertension, benign     CONTROLLED     Lumbar herniated disc     Mitral valve disorders(424.0)      MILD MR     Obesity, unspecified     Transfusion history     PATIENT HAS A HX OF RECEIVING BLOOD OR BLOOD PRODUCT TRANSFUSION(S)       PSH:  Past Surgical History:   Procedure Laterality Date    CHOLECYSTECTOMY, LAPAROSCOPIC      HERNIA REPAIR      TUBAL LIGATION         MEDS:  Current Outpatient Medications   Medication Sig Dispense Refill    metoprolol tartrate (LOPRESSOR) 25 MG tablet TAKE ONE-HALF TABLET BY MOUTH 2 TIMES DAILY 90 tablet 3    ELIQUIS 5 MG TABS tablet TAKE 1 TABLET BY MOUTH 2 TIMES DAILY 180 tablet 3    hydroCHLOROthiazide 12.5 MG capsule Take 1 capsule by mouth every morning 90 capsule 3    losartan (COZAAR) 25 MG tablet Take 1 tablet by mouth daily 1 tablet 0    potassium chloride (KLOR-CON M) 20 MEQ extended release tablet Take 1 tablet by mouth daily 90 tablet 3    zolpidem (AMBIEN) 5 MG tablet Take 1 tablet by mouth nightly as needed for Sleep.       No current facility-administered medications for this visit.       Allergies and Sensitivities:  Allergies   Allergen Reactions    Codeine Itching     Other reaction(s): rash/itching    Ace Inhibitors Cough       Family History:  Family History   Problem Relation Age of Onset    Heart Surgery Mother     Heart Attack Mother     Heart Disease Mother 50         WITH MI    Heart Attack Father     No Known Problems Sister     No Known Problems Brother     No Known Problems Maternal Aunt     No Known Problems Maternal Uncle     No Known Problems Paternal Aunt     No Known Problems Paternal Uncle     No Known Problems Maternal Grandmother     No Known Problems Maternal Grandfather     No Known Problems Paternal Grandmother     No Known Problems Paternal Grandfather     No Known Problems Other        Social History:  She  reports that she quit

## 2025-06-19 NOTE — PATIENT INSTRUCTIONS
Echocardiogram; Dx: severe aortic stenosis  Follow-up with Dr. Brewster as scheduled and as needed  Low sodium, heart healthy diet, Mediterranean diet

## 2025-08-18 RX ORDER — POTASSIUM CHLORIDE 1500 MG/1
20 TABLET, EXTENDED RELEASE ORAL DAILY
Qty: 90 TABLET | Refills: 3 | Status: SHIPPED | OUTPATIENT
Start: 2025-08-18

## 2025-08-18 RX ORDER — HYDROCHLOROTHIAZIDE 12.5 MG/1
12.5 CAPSULE ORAL EVERY MORNING
Qty: 90 CAPSULE | Refills: 3 | Status: SHIPPED | OUTPATIENT
Start: 2025-08-18

## 2025-08-25 ENCOUNTER — RESULTS FOLLOW-UP (OUTPATIENT)
Age: 60
End: 2025-08-25